# Patient Record
Sex: FEMALE | Race: WHITE | Employment: OTHER | URBAN - NONMETROPOLITAN AREA
[De-identification: names, ages, dates, MRNs, and addresses within clinical notes are randomized per-mention and may not be internally consistent; named-entity substitution may affect disease eponyms.]

---

## 2019-05-21 ENCOUNTER — HOSPITAL ENCOUNTER (OUTPATIENT)
Facility: HOSPITAL | Age: 59
End: 2019-05-21
Attending: ORTHOPAEDIC SURGERY | Admitting: ORTHOPAEDIC SURGERY
Payer: COMMERCIAL

## 2020-10-22 ENCOUNTER — TRANSFERRED RECORDS (OUTPATIENT)
Dept: HEALTH INFORMATION MANAGEMENT | Facility: HOSPITAL | Age: 60
End: 2020-10-22

## 2020-11-03 ENCOUNTER — TRANSFERRED RECORDS (OUTPATIENT)
Dept: HEALTH INFORMATION MANAGEMENT | Facility: HOSPITAL | Age: 60
End: 2020-11-03

## 2020-11-04 ENCOUNTER — HOSPITAL ENCOUNTER (INPATIENT)
Facility: HOSPITAL | Age: 60
Setting detail: SURGERY ADMIT
End: 2020-11-04
Attending: ORTHOPAEDIC SURGERY | Admitting: ORTHOPAEDIC SURGERY
Payer: COMMERCIAL

## 2020-11-09 ENCOUNTER — ANESTHESIA EVENT (OUTPATIENT)
Dept: SURGERY | Facility: HOSPITAL | Age: 60
End: 2020-11-09
Payer: COMMERCIAL

## 2020-11-09 ASSESSMENT — LIFESTYLE VARIABLES: TOBACCO_USE: 1

## 2020-11-09 NOTE — ANESTHESIA PREPROCEDURE EVALUATION
Anesthesia Pre-Procedure Evaluation    Patient: Ana Pittman   MRN: [id number] : 1960          Preoperative Diagnosis: Right knee pain, unspecified chronicity [M25.561]    Procedure(s):  RIGHT TOTAL KNEE ARTHROPLASTY    Past Medical History:   Diagnosis Date     Mitral valve regurgitation      Past Surgical History:   Procedure Laterality Date     CHOLECYSTECTOMY       GYN SURGERY       ORTHOPEDIC SURGERY       RELEASE CARPAL TUNNEL Left 2015    Procedure: RELEASE CARPAL TUNNEL;  Surgeon: Cali Calvert MD;  Location: [facility]     RELEASE CARPAL TUNNEL Right 2015    Procedure: RELEASE CARPAL TUNNEL;  Surgeon: Cali Calvert MD;  Location: [facility]       Anesthesia Evaluation     . Pt has had prior anesthetic. Type: General           ROS/MED HX    ENT/Pulmonary:     (+)PIO risk factors obese, tobacco use, Current use 1/2 packs/day  , . .    Neurologic:  - neg neurologic ROS     Cardiovascular:     (+) hypertension--CAD (rx NTG), --. : . . . :. valvular problems/murmurs type: AS murmur:. Previous cardiac testing Echodate:2020results:EF 65%  Mild to moderate aortic stenosis  2-3/6 murmur  Pt walks 3 miles per day.date: results:ECG reviewed date:2020 results:SB 51 date: results:          METS/Exercise Tolerance:  >4 METS   Hematologic:     (+) History of Transfusion -      Musculoskeletal:   (+) arthritis,  other musculoskeletal- DJD right knee      GI/Hepatic:  - neg GI/hepatic ROS       Renal/Genitourinary:  - ROS Renal section negative       Endo:     (+) Obesity, .      Psychiatric:     (+) psychiatric history anxiety and depression      Infectious Disease:  - neg infectious disease ROS       Malignancy:      - no malignancy   Other: Comment: rx Suboxone   (+) H/O chronic opiod use ,                         Physical Exam  Normal systems: pulmonary and dental    Airway   Mallampati: II  TM distance: >3 FB  Neck ROM: full    Dental     Cardiovascular   Rhythm and rate: regular and normal  (+)  "murmur       Pulmonary    breath sounds clear to auscultation            No results found for: WBC, HGB, HCT, PLT, CRP, SED, NA, POTASSIUM, CHLORIDE, CO2, BUN, CR, GLC, DOROTEO, PHOS, MAG, ALBUMIN, PROTTOTAL, ALT, AST, GGT, ALKPHOS, BILITOTAL, BILIDIRECT, LIPASE, AMYLASE, ADRIÁN, PTT, INR, FIBR, TSH, T4, T3, HCG, HCGS, CKTOTAL, CKMB, TROPN    Preop Vitals  BP Readings from Last 3 Encounters:   07/01/15 163/74   05/19/15 143/77    Pulse Readings from Last 3 Encounters:   No data found for Pulse      Resp Readings from Last 3 Encounters:   07/01/15 18   05/19/15 16    SpO2 Readings from Last 3 Encounters:   07/01/15 97%   05/19/15 96%      Temp Readings from Last 1 Encounters:   07/01/15 96.9  F (36.1  C) (Oral)    Ht Readings from Last 1 Encounters:   06/25/15 1.702 m (5' 7\")      Wt Readings from Last 1 Encounters:   06/25/15 100.7 kg (222 lb)    Estimated body mass index is 34.77 kg/m  as calculated from the following:    Height as of 6/25/15: 1.702 m (5' 7\").    Weight as of 6/25/15: 100.7 kg (222 lb).       Anesthesia Plan      History & Physical Review  History and physical reviewed and following examination; no interval change.    ASA Status:  3 .    NPO Status:  > 6 hours    Plan for Spinal, Peripheral Nerve Block and MAC (adductor canal) Maintenance will be TIVA.  Reason for MAC:  Chronic cardiopulmonary disease (G9) and Extreme anxiety (QS)    Needs cardiology clearance for aortic stenosis, due 11/10 with Dr. Barker gave clearance for surgery today.  OK to proceed.                Postoperative Care      Consents  Anesthetic plan, risks, benefits and alternatives discussed with:  Patient..                 TAMMI Dempsey CRNA  "

## 2020-11-10 ENCOUNTER — TRANSFERRED RECORDS (OUTPATIENT)
Dept: HEALTH INFORMATION MANAGEMENT | Facility: HOSPITAL | Age: 60
End: 2020-11-10

## 2020-11-10 LAB
ALT SERPL-CCNC: 23 U/L (ref 0–55)
AST SERPL-CCNC: 19 U/L (ref 0–35)
CREAT SERPL-MCNC: 0.82 MG/DL (ref 0.6–1.1)
GFR SERPL CREATININE-BSD FRML MDRD: 71 ML/MIN/1.73M2
GLUCOSE SERPL-MCNC: 91 MG/DL (ref 70–100)
POTASSIUM SERPL-SCNC: 4 MEQ/L (ref 3.5–5.3)

## 2020-11-10 RX ORDER — BUPRENORPHINE HYDROCHLORIDE AND NALOXONE HYDROCHLORIDE DIHYDRATE 8; 2 MG/1; MG/1
1 TABLET SUBLINGUAL 2 TIMES DAILY
COMMUNITY

## 2020-11-10 RX ORDER — POTASSIUM CHLORIDE 1500 MG/1
20 TABLET, EXTENDED RELEASE ORAL DAILY
COMMUNITY
Start: 2019-05-02

## 2020-11-10 RX ORDER — DIAZEPAM 10 MG
10 TABLET ORAL 2 TIMES DAILY
COMMUNITY
Start: 2019-10-01

## 2020-11-10 RX ORDER — IBUPROFEN 600 MG/1
600 TABLET, FILM COATED ORAL 2 TIMES DAILY PRN
Status: ON HOLD | COMMUNITY
Start: 2020-09-01 | End: 2020-11-11

## 2020-11-10 RX ORDER — PRAVASTATIN SODIUM 20 MG
20 TABLET ORAL DAILY
Status: ON HOLD | COMMUNITY
Start: 2019-02-22 | End: 2020-11-11

## 2020-11-10 RX ORDER — ESTRADIOL 0.04 MG/D
1 PATCH TRANSDERMAL
COMMUNITY

## 2020-11-10 RX ORDER — POTASSIUM CHLORIDE 1500 MG/1
20 TABLET, EXTENDED RELEASE ORAL DAILY
COMMUNITY
End: 2020-11-10

## 2020-11-10 NOTE — PLAN OF CARE
Prior to Admission Medication Reconciliation:     Medications added:   [] None  [x] As listed below:    veto aleman    Diazepam    suboxone    Medications deleted:   [] None  [x] As listed below:    OTC medications and older meds from 2015- pt hasn't been seen at a [place] facility since 2015 so any OTC medications need to be re-added. None of the ones deleted were on either medlist from [place] and [facility]:  Reviewed by Susan Stevenson ([facility]) on 11/10/20 at 1121    Medication Sig Status     Discontinued 11/10/20 1117     Discontinued 11/10/20 1116     Discontinued 11/10/20 1111   buprenorphine-naloxone (SUBOXONE) 8-2 MG SUBL sublingual tablet Place 1 tablet under the tongue 2 times daily  Active     Discontinued 11/10/20 1116     Discontinued 11/10/20 1119     Discontinued 11/10/20 1115     Discontinued 11/10/20 1119   diazepam (VALIUM) 10 MG tablet Take 10 mg by mouth 2 times daily  Active   DULoxetine (CYMBALTA) 60 MG capsule Take 60 mg by mouth 2 times daily  Active     Discontinued 11/10/20 1119   EPINEPHrine (EPIPEN) 0.3 MG/0.3ML injection Inject 0.3 mg into the muscle every five minutes as needed, in case of bee sting. Active   estradiol (CLIMARA) 0.0375 MG/24HR weekly patch Place 1 patch onto the skin 2 times daily Active     Discontinued 11/10/20 1116     Discontinued 11/10/20 1120     Discontinued 11/10/20 1116     Discontinued 11/10/20 1120     Discontinued 11/10/20 1116   ibuprofen (ADVIL/MOTRIN) 600 MG tablet Take 600 mg by mouth 2 times daily as needed Active     Discontinued 11/10/20 1120   lisinopril (ZESTRIL) 40 MG tablet Take 40 mg by mouth daily  Active     Discontinued 11/10/20 1120   nitroglycerin (NITROSTAT) 0.4 MG SL tablet Place under the tongue every 5 minutes as needed for chest pain for up to 3 doses. Active     Discontinued 11/10/20 1116   potassium chloride ER (KLOR-CON M) 20 MEQ CR tablet Take 20 mEq by mouth daily Active   pravastatin (PRAVACHOL) 20 MG tablet Take 20 mg by mouth  daily Active     Discontinued 11/10/20 1121   progesterone (PROMETRIUM) 100 MG capsule Take 100 mg by mouth daily  Active       Changes made to existing medications:   [] None  [x] As listed below:    Input missing strengths and frequencies    Last times/dates taken verified with patient:  [x] Pt not admitted yet  [] Nurse completed no changes made  [] Unable to review with patient:  [] Nurse completed/changes made:     Allergy modifications:    [x]Did not review  []Patient verified NKA  []Patient verified current existing allergies: no changes made  []New allergies: listed below    Medication reconciliation sources:   []Patient  []Patient family member/emergency contact: **  [][facility] Report Review  []Epic Chart Review  [x]Care Everywhere review: [facility]  Medication Sig Dispensed Refills Start Date End Date Status   potassium chloride CR (K-DUR, KLOR-CON M) 20 MEQ tablet   Take 1 Tab by mouth one time a day. Do not crush. 30 Tab   0 05/02/2019   Active   pravastatin (PRAVACHOL) 20 MG tablet   Take 1 Tab by mouth one time a day. 30 Tab   5 02/22/2019   Active   diazePAM (VALIUM) 10 MG tablet   Take 1 Tab by mouth two times a day. 60 Tab   0 10/01/2019   Active   EPINEPHrine, auto-injector, 0.3 MG/0.3ML Solution Auto-injector injection   Inject 1 pen (0.3 mL) every five minutes as needed in case of bee sting. 2 Pen   2 05/29/2019   Active   potassium chloride CR (K-DUR, KLOR-CON M) 20 MEQ tablet   Take 1 Tab by mouth one time a day. 90 Tab   2 06/11/2019   Active   progesterone (Prometrium) 100 MG capsule   Take 1 Cap by mouth one time a day. 30 Cap   1 01/27/2020   Active   nitroglycerin (Nitrostat) 0.4 MG sublingual tablet    Indications: Nonrheumatic aortic (valve) stenosis, Precordial pain Dissolve 1 tablet (0.4 mg) under the tongue Every 5 minutes as needed for chest pain for up to 3 doses May repeat every 5 minutes for a total of 3 doses. 25 Tab   0 03/12/2020   Active   ibuprofen (Motrin) 600 MG tablet    Take 1 Tab by mouth two times a day as needed. DC Robaxin 60 Tab   3 09/01/2020   Active   EPINEPHrine, auto-injector, 0.3 MG/0.3ML Solution Auto-injector injection   Inject 1 pen (0.3 mL) under the skin every five minutes as needed, in case of bee sting. 2 Each   2 09/01/2020   Active   [place]:  Medication Sig Dispensed Refills Start Date End Date Status   lisinopril (PRINIVIL, ZESTRIL) 40 mg tablet   Take 40 mg by mouth 1 time per day.   0     Active   DULoxetine (CYMBALTA) 60 mg capsule   Take 60 mg by mouth 2 times a day    0     Active   progesterone micronized (PROMETRIUM) 100 MG capsule   100 mg every night at bedtime.   0     Active   estradiol (CLIMARA) 0.0375 mg/24 hr patch   Apply 1 patch to the skin 2 times a week    0     Active   EPINEPHrine for anaphylaxis 0.3 mg/0.3 mL IM injection       0 05/14/2015   Active   buprenorphine-nalOXone (SUBOXONE) 8-2 MG SUBL   Dissolve 1 tablet under the tongue 2 times a day    0     Active   potassium chloride (KLOR-CON M20) 20 MEQ CR tablet   Take 20 mEq by mouth 1 time per day   0 05/02/2019   Active   nitroglycerin (NITROSTAT) 0.4 mg sublingual tablet    Indications: Nonrheumatic aortic valve stenosis, Precordial pain Dissolve 1 tablet (0.4 mg) under the tongue Every 5 minutes as needed for chest pain for up to 3 doses May repeat every 5 minutes for a total of 3 doses. 25 tablet   0 03/12/2020 03/17/2021 Active         [x]Pharmacy med list: [facility]/ [facility]   Name Strength Instructions Last Fill Date QTY/DS Notes   [x] progesterone 100 mg daily 9/8/20 90    [x] pravachol 20 mg daily 7/5/20  90 Being filled now   [x] Potassium chl 20 meq  daily 10/28/20 90    [x] lisinopril 40 mg daily 10/16/20 90    [x] duloxetine 60 mg BID 9/22/20 180    [x] vivelle dot 0.375 patch Twice weekly 9/9/20 90 ds    []         []         [x] diazepam 10 mg BID 11/5/20 30/15 [facility] [phone]   [x] suboxone  8-2 mg Dissolve one tablet under the tongue BID 11/3/20 60/30 [facility]    []Pharmacy phone call  []Outside meds dispense report  []Nursing home or Assisted Living MAR:  []Other: **    Pharmacy desired at discharge: uses [facility] or [facility]    Is patient on coumadin?  [x]No      Requests for consultation by provider or pharmacist:   [] Patient understands why all of their meds were prescribed and how to take them. No questions.   [x] Fill dates coincide with compliancy for all maintenance meds.   [] Fill dates do not coincide with compliancy with maintenance meds. See notes in PTA medlist about how patient is taking.   [] Patient has questions about the following:    Comments: medications ready to review with patient. Reconciled using [place], [facility] and pharmacy records. No concerns at this time.       Susan Stevenson on 11/10/2020 at 11:00 AM       Discrepancies: [x] No []Not Applicable []Yes: listed below    Time spent on medication reconciliation:   []5-20 mins  [x]20-40 mins  []> 40 mins    Issues completing PTA medication reconciliation:  [] On hold for a long time  [] Waited for a call back  [] Fax didn't come through  [] Fax took a long time  [] Other:    Notifying appropriate party of changes/additions/discrepancies:  []No pertinent changes made, notification not necessary.   [] Notified attending provider via text page  [] Notified attending provider in person  [] Notified pharmacy  [] Notified nurse  [] Attending provider not available, left detailed notes  [x] Changes/additions made don't need provider notification because provider has not seen patient or input any orders  [] Changes/additions made don't need provider notification because changes made are to medications not ordered

## 2020-11-11 ENCOUNTER — HOSPITAL ENCOUNTER (INPATIENT)
Facility: HOSPITAL | Age: 60
LOS: 2 days | Discharge: HOME HEALTH | End: 2020-11-13
Attending: ORTHOPAEDIC SURGERY | Admitting: ORTHOPAEDIC SURGERY
Payer: COMMERCIAL

## 2020-11-11 ENCOUNTER — APPOINTMENT (OUTPATIENT)
Dept: PHYSICAL THERAPY | Facility: HOSPITAL | Age: 60
End: 2020-11-11
Attending: ORTHOPAEDIC SURGERY
Payer: COMMERCIAL

## 2020-11-11 ENCOUNTER — APPOINTMENT (OUTPATIENT)
Dept: GENERAL RADIOLOGY | Facility: HOSPITAL | Age: 60
End: 2020-11-11
Attending: PHYSICIAN ASSISTANT
Payer: COMMERCIAL

## 2020-11-11 ENCOUNTER — ANESTHESIA (OUTPATIENT)
Dept: SURGERY | Facility: HOSPITAL | Age: 60
End: 2020-11-11
Payer: COMMERCIAL

## 2020-11-11 DIAGNOSIS — Z96.651 S/P TOTAL KNEE ARTHROPLASTY, RIGHT: Primary | ICD-10-CM

## 2020-11-11 PROCEDURE — 97161 PT EVAL LOW COMPLEX 20 MIN: CPT | Mod: GP

## 2020-11-11 PROCEDURE — 3E0T3BZ INTRODUCTION OF ANESTHETIC AGENT INTO PERIPHERAL NERVES AND PLEXI, PERCUTANEOUS APPROACH: ICD-10-PCS | Performed by: NURSE ANESTHETIST, CERTIFIED REGISTERED

## 2020-11-11 PROCEDURE — 370N000002 HC ANESTHESIA TECHNICAL FEE, EACH ADDTL 15 MIN: Performed by: ORTHOPAEDIC SURGERY

## 2020-11-11 PROCEDURE — 258N000003 HC RX IP 258 OP 636: Performed by: NURSE ANESTHETIST, CERTIFIED REGISTERED

## 2020-11-11 PROCEDURE — 999N000157 HC STATISTIC RCP TIME EA 10 MIN

## 2020-11-11 PROCEDURE — 76942 ECHO GUIDE FOR BIOPSY: CPT | Mod: 26 | Performed by: NURSE ANESTHETIST, CERTIFIED REGISTERED

## 2020-11-11 PROCEDURE — 370N000001 HC ANESTHESIA TECHNICAL FEE, 1ST 30 MIN: Performed by: ORTHOPAEDIC SURGERY

## 2020-11-11 PROCEDURE — 360N000033 HC SURGERY LEVEL 5 EA 15 ADDTL MIN: Performed by: ORTHOPAEDIC SURGERY

## 2020-11-11 PROCEDURE — 250N000009 HC RX 250: Performed by: NURSE ANESTHETIST, CERTIFIED REGISTERED

## 2020-11-11 PROCEDURE — 250N000011 HC RX IP 250 OP 636: Performed by: ORTHOPAEDIC SURGERY

## 2020-11-11 PROCEDURE — 0SRC0JA REPLACEMENT OF RIGHT KNEE JOINT WITH SYNTHETIC SUBSTITUTE, UNCEMENTED, OPEN APPROACH: ICD-10-PCS | Performed by: ORTHOPAEDIC SURGERY

## 2020-11-11 PROCEDURE — 761N000003 HC RECOVERY PHASE 1 LEVEL 2 FIRST HR: Performed by: ORTHOPAEDIC SURGERY

## 2020-11-11 PROCEDURE — C9290 INJ, BUPIVACAINE LIPOSOME: HCPCS | Performed by: ORTHOPAEDIC SURGERY

## 2020-11-11 PROCEDURE — 250N000011 HC RX IP 250 OP 636: Performed by: NURSE ANESTHETIST, CERTIFIED REGISTERED

## 2020-11-11 PROCEDURE — 88300 SURGICAL PATH GROSS: CPT | Mod: TC | Performed by: ORTHOPAEDIC SURGERY

## 2020-11-11 PROCEDURE — 250N000013 HC RX MED GY IP 250 OP 250 PS 637: Performed by: ORTHOPAEDIC SURGERY

## 2020-11-11 PROCEDURE — C1776 JOINT DEVICE (IMPLANTABLE): HCPCS | Performed by: ORTHOPAEDIC SURGERY

## 2020-11-11 PROCEDURE — 27447 TOTAL KNEE ARTHROPLASTY: CPT | Performed by: NURSE ANESTHETIST, CERTIFIED REGISTERED

## 2020-11-11 PROCEDURE — 360N000032 HC SURGERY LEVEL 5 1ST 30 MIN: Performed by: ORTHOPAEDIC SURGERY

## 2020-11-11 PROCEDURE — 999N000063 XR KNEE PORT RT 1/2 VW: Mod: RT

## 2020-11-11 PROCEDURE — 258N000001 HC RX 258: Performed by: ORTHOPAEDIC SURGERY

## 2020-11-11 PROCEDURE — 271N000001 HC OR GENERAL SUPPLY NON-STERILE: Performed by: ORTHOPAEDIC SURGERY

## 2020-11-11 PROCEDURE — 97530 THERAPEUTIC ACTIVITIES: CPT | Mod: GP

## 2020-11-11 PROCEDURE — 272N000001 HC OR GENERAL SUPPLY STERILE: Performed by: ORTHOPAEDIC SURGERY

## 2020-11-11 PROCEDURE — C9290 INJ, BUPIVACAINE LIPOSOME: HCPCS | Performed by: NURSE ANESTHETIST, CERTIFIED REGISTERED

## 2020-11-11 PROCEDURE — 64447 NJX AA&/STRD FEMORAL NRV IMG: CPT | Mod: XU | Performed by: NURSE ANESTHETIST, CERTIFIED REGISTERED

## 2020-11-11 PROCEDURE — 999N000135 HC STATISTIC PRE PROC ASSESS I: Performed by: ORTHOPAEDIC SURGERY

## 2020-11-11 PROCEDURE — 99232 SBSQ HOSP IP/OBS MODERATE 35: CPT | Performed by: INTERNAL MEDICINE

## 2020-11-11 PROCEDURE — 120N000001 HC R&B MED SURG/OB

## 2020-11-11 PROCEDURE — 250N000013 HC RX MED GY IP 250 OP 250 PS 637: Performed by: INTERNAL MEDICINE

## 2020-11-11 DEVICE — IMPLANTABLE DEVICE: Type: IMPLANTABLE DEVICE | Site: KNEE | Status: FUNCTIONAL

## 2020-11-11 RX ORDER — SODIUM CHLORIDE, SODIUM LACTATE, POTASSIUM CHLORIDE, CALCIUM CHLORIDE 600; 310; 30; 20 MG/100ML; MG/100ML; MG/100ML; MG/100ML
INJECTION, SOLUTION INTRAVENOUS CONTINUOUS
Status: DISCONTINUED | OUTPATIENT
Start: 2020-11-11 | End: 2020-11-11 | Stop reason: HOSPADM

## 2020-11-11 RX ORDER — DULOXETIN HYDROCHLORIDE 60 MG/1
60 CAPSULE, DELAYED RELEASE ORAL 2 TIMES DAILY
Status: DISCONTINUED | OUTPATIENT
Start: 2020-11-11 | End: 2020-11-13 | Stop reason: HOSPADM

## 2020-11-11 RX ORDER — HYDRALAZINE HYDROCHLORIDE 20 MG/ML
2.5-5 INJECTION INTRAMUSCULAR; INTRAVENOUS EVERY 10 MIN PRN
Status: DISCONTINUED | OUTPATIENT
Start: 2020-11-11 | End: 2020-11-11 | Stop reason: HOSPADM

## 2020-11-11 RX ORDER — AMOXICILLIN 250 MG
1 CAPSULE ORAL 2 TIMES DAILY
Status: DISCONTINUED | OUTPATIENT
Start: 2020-11-11 | End: 2020-11-13 | Stop reason: HOSPADM

## 2020-11-11 RX ORDER — DIAZEPAM 5 MG
5 TABLET ORAL 2 TIMES DAILY
Status: DISCONTINUED | OUTPATIENT
Start: 2020-11-11 | End: 2020-11-13 | Stop reason: HOSPADM

## 2020-11-11 RX ORDER — NALOXONE HYDROCHLORIDE 0.4 MG/ML
.1-.4 INJECTION, SOLUTION INTRAMUSCULAR; INTRAVENOUS; SUBCUTANEOUS
Status: DISCONTINUED | OUTPATIENT
Start: 2020-11-11 | End: 2020-11-11 | Stop reason: HOSPADM

## 2020-11-11 RX ORDER — BISACODYL 10 MG
10 SUPPOSITORY, RECTAL RECTAL DAILY PRN
Status: DISCONTINUED | OUTPATIENT
Start: 2020-11-11 | End: 2020-11-13 | Stop reason: HOSPADM

## 2020-11-11 RX ORDER — FENTANYL CITRATE 50 UG/ML
25-50 INJECTION, SOLUTION INTRAMUSCULAR; INTRAVENOUS
Status: DISCONTINUED | OUTPATIENT
Start: 2020-11-11 | End: 2020-11-11 | Stop reason: HOSPADM

## 2020-11-11 RX ORDER — OXYCODONE HYDROCHLORIDE 5 MG/1
15 TABLET ORAL EVERY 4 HOURS PRN
Status: DISCONTINUED | OUTPATIENT
Start: 2020-11-11 | End: 2020-11-13 | Stop reason: HOSPADM

## 2020-11-11 RX ORDER — LISINOPRIL 20 MG/1
40 TABLET ORAL DAILY
Status: DISCONTINUED | OUTPATIENT
Start: 2020-11-12 | End: 2020-11-13 | Stop reason: HOSPADM

## 2020-11-11 RX ORDER — LABETALOL 20 MG/4 ML (5 MG/ML) INTRAVENOUS SYRINGE
10
Status: DISCONTINUED | OUTPATIENT
Start: 2020-11-11 | End: 2020-11-11 | Stop reason: HOSPADM

## 2020-11-11 RX ORDER — BUPIVACAINE HYDROCHLORIDE 2.5 MG/ML
INJECTION, SOLUTION EPIDURAL; INFILTRATION; INTRACAUDAL PRN
Status: DISCONTINUED | OUTPATIENT
Start: 2020-11-11 | End: 2020-11-11

## 2020-11-11 RX ORDER — DOCUSATE SODIUM 100 MG/1
100 CAPSULE, LIQUID FILLED ORAL 2 TIMES DAILY
Status: DISCONTINUED | OUTPATIENT
Start: 2020-11-11 | End: 2020-11-13 | Stop reason: HOSPADM

## 2020-11-11 RX ORDER — METHOCARBAMOL 750 MG/1
750 TABLET, FILM COATED ORAL EVERY 6 HOURS PRN
Status: DISCONTINUED | OUTPATIENT
Start: 2020-11-11 | End: 2020-11-13 | Stop reason: HOSPADM

## 2020-11-11 RX ORDER — DIPHENHYDRAMINE HCL 25 MG
25 CAPSULE ORAL EVERY 6 HOURS PRN
Status: DISCONTINUED | OUTPATIENT
Start: 2020-11-11 | End: 2020-11-13 | Stop reason: HOSPADM

## 2020-11-11 RX ORDER — LIDOCAINE 40 MG/G
CREAM TOPICAL
Status: DISCONTINUED | OUTPATIENT
Start: 2020-11-11 | End: 2020-11-11 | Stop reason: HOSPADM

## 2020-11-11 RX ORDER — ONDANSETRON 4 MG/1
4 TABLET, ORALLY DISINTEGRATING ORAL EVERY 30 MIN PRN
Status: DISCONTINUED | OUTPATIENT
Start: 2020-11-11 | End: 2020-11-11 | Stop reason: HOSPADM

## 2020-11-11 RX ORDER — OXYCODONE HYDROCHLORIDE 5 MG/1
10 TABLET ORAL
Status: DISCONTINUED | OUTPATIENT
Start: 2020-11-11 | End: 2020-11-13 | Stop reason: HOSPADM

## 2020-11-11 RX ORDER — DIAZEPAM 5 MG
10 TABLET ORAL 2 TIMES DAILY
Status: DISCONTINUED | OUTPATIENT
Start: 2020-11-11 | End: 2020-11-11

## 2020-11-11 RX ORDER — HYDROMORPHONE HYDROCHLORIDE 1 MG/ML
0.5 INJECTION, SOLUTION INTRAMUSCULAR; INTRAVENOUS; SUBCUTANEOUS
Status: DISCONTINUED | OUTPATIENT
Start: 2020-11-11 | End: 2020-11-13 | Stop reason: HOSPADM

## 2020-11-11 RX ORDER — NICOTINE 21 MG/24HR
1 PATCH, TRANSDERMAL 24 HOURS TRANSDERMAL DAILY
Status: DISCONTINUED | OUTPATIENT
Start: 2020-11-11 | End: 2020-11-13 | Stop reason: HOSPADM

## 2020-11-11 RX ORDER — BUPIVACAINE HYDROCHLORIDE 7.5 MG/ML
INJECTION, SOLUTION INTRASPINAL PRN
Status: DISCONTINUED | OUTPATIENT
Start: 2020-11-11 | End: 2020-11-11

## 2020-11-11 RX ORDER — FENTANYL CITRATE 50 UG/ML
25-50 INJECTION, SOLUTION INTRAMUSCULAR; INTRAVENOUS EVERY 5 MIN PRN
Status: DISCONTINUED | OUTPATIENT
Start: 2020-11-11 | End: 2020-11-11 | Stop reason: HOSPADM

## 2020-11-11 RX ORDER — BUPIVACAINE HYDROCHLORIDE 2.5 MG/ML
INJECTION, SOLUTION EPIDURAL; INFILTRATION; INTRACAUDAL
Status: DISCONTINUED
Start: 2020-11-11 | End: 2020-11-11 | Stop reason: HOSPADM

## 2020-11-11 RX ORDER — POLYETHYLENE GLYCOL 3350 17 G/17G
17 POWDER, FOR SOLUTION ORAL DAILY
Status: DISCONTINUED | OUTPATIENT
Start: 2020-11-12 | End: 2020-11-13 | Stop reason: HOSPADM

## 2020-11-11 RX ORDER — CEFAZOLIN SODIUM 2 G/100ML
2 INJECTION, SOLUTION INTRAVENOUS
Status: COMPLETED | OUTPATIENT
Start: 2020-11-11 | End: 2020-11-11

## 2020-11-11 RX ORDER — ONDANSETRON 2 MG/ML
4 INJECTION INTRAMUSCULAR; INTRAVENOUS EVERY 30 MIN PRN
Status: DISCONTINUED | OUTPATIENT
Start: 2020-11-11 | End: 2020-11-11 | Stop reason: HOSPADM

## 2020-11-11 RX ORDER — LIDOCAINE 40 MG/G
CREAM TOPICAL
Status: DISCONTINUED | OUTPATIENT
Start: 2020-11-11 | End: 2020-11-13 | Stop reason: HOSPADM

## 2020-11-11 RX ORDER — MEPERIDINE HYDROCHLORIDE 25 MG/ML
12.5 INJECTION INTRAMUSCULAR; INTRAVENOUS; SUBCUTANEOUS
Status: DISCONTINUED | OUTPATIENT
Start: 2020-11-11 | End: 2020-11-11 | Stop reason: HOSPADM

## 2020-11-11 RX ORDER — PRAVASTATIN SODIUM 20 MG
20 TABLET ORAL DAILY
Status: DISCONTINUED | OUTPATIENT
Start: 2020-11-11 | End: 2020-11-13 | Stop reason: HOSPADM

## 2020-11-11 RX ORDER — PROPOFOL 10 MG/ML
INJECTION, EMULSION INTRAVENOUS CONTINUOUS PRN
Status: DISCONTINUED | OUTPATIENT
Start: 2020-11-11 | End: 2020-11-11

## 2020-11-11 RX ORDER — PROCHLORPERAZINE MALEATE 5 MG
10 TABLET ORAL EVERY 6 HOURS PRN
Status: DISCONTINUED | OUTPATIENT
Start: 2020-11-11 | End: 2020-11-13 | Stop reason: HOSPADM

## 2020-11-11 RX ORDER — BUPIVACAINE HYDROCHLORIDE 2.5 MG/ML
INJECTION, SOLUTION INFILTRATION; PERINEURAL PRN
Status: DISCONTINUED | OUTPATIENT
Start: 2020-11-11 | End: 2020-11-11 | Stop reason: HOSPADM

## 2020-11-11 RX ORDER — ONDANSETRON 4 MG/1
4 TABLET, ORALLY DISINTEGRATING ORAL EVERY 6 HOURS PRN
Status: DISCONTINUED | OUTPATIENT
Start: 2020-11-11 | End: 2020-11-13 | Stop reason: HOSPADM

## 2020-11-11 RX ORDER — CEFAZOLIN SODIUM 2 G/100ML
2 INJECTION, SOLUTION INTRAVENOUS EVERY 8 HOURS
Status: COMPLETED | OUTPATIENT
Start: 2020-11-11 | End: 2020-11-12

## 2020-11-11 RX ORDER — IBUPROFEN 600 MG/1
600 TABLET, FILM COATED ORAL EVERY 6 HOURS PRN
Status: DISCONTINUED | OUTPATIENT
Start: 2020-11-11 | End: 2020-11-13 | Stop reason: HOSPADM

## 2020-11-11 RX ORDER — ONDANSETRON 2 MG/ML
4 INJECTION INTRAMUSCULAR; INTRAVENOUS EVERY 6 HOURS PRN
Status: DISCONTINUED | OUTPATIENT
Start: 2020-11-11 | End: 2020-11-13 | Stop reason: HOSPADM

## 2020-11-11 RX ORDER — HYDROMORPHONE HYDROCHLORIDE 1 MG/ML
.3-.5 INJECTION, SOLUTION INTRAMUSCULAR; INTRAVENOUS; SUBCUTANEOUS EVERY 10 MIN PRN
Status: DISCONTINUED | OUTPATIENT
Start: 2020-11-11 | End: 2020-11-11 | Stop reason: HOSPADM

## 2020-11-11 RX ADMIN — HYDROMORPHONE HYDROCHLORIDE 0.5 MG: 1 INJECTION, SOLUTION INTRAMUSCULAR; INTRAVENOUS; SUBCUTANEOUS at 21:26

## 2020-11-11 RX ADMIN — SODIUM CHLORIDE, POTASSIUM CHLORIDE, SODIUM LACTATE AND CALCIUM CHLORIDE: 600; 310; 30; 20 INJECTION, SOLUTION INTRAVENOUS at 09:08

## 2020-11-11 RX ADMIN — SODIUM CHLORIDE, POTASSIUM CHLORIDE, SODIUM LACTATE AND CALCIUM CHLORIDE: 600; 310; 30; 20 INJECTION, SOLUTION INTRAVENOUS at 07:29

## 2020-11-11 RX ADMIN — ASPIRIN 325 MG: 325 TABLET, COATED ORAL at 11:36

## 2020-11-11 RX ADMIN — DOCUSATE SODIUM 100 MG: 100 CAPSULE, LIQUID FILLED ORAL at 11:36

## 2020-11-11 RX ADMIN — HYDROMORPHONE HYDROCHLORIDE 0.5 MG: 1 INJECTION, SOLUTION INTRAMUSCULAR; INTRAVENOUS; SUBCUTANEOUS at 18:18

## 2020-11-11 RX ADMIN — HYDROMORPHONE HYDROCHLORIDE 0.5 MG: 1 INJECTION, SOLUTION INTRAMUSCULAR; INTRAVENOUS; SUBCUTANEOUS at 12:38

## 2020-11-11 RX ADMIN — PROGESTERONE 100 MG: 100 CAPSULE ORAL at 20:05

## 2020-11-11 RX ADMIN — TRANEXAMIC ACID 1 G: 1 INJECTION, SOLUTION INTRAVENOUS at 08:14

## 2020-11-11 RX ADMIN — PRAVASTATIN SODIUM 20 MG: 20 TABLET ORAL at 11:36

## 2020-11-11 RX ADMIN — TRANEXAMIC ACID 1 G: 1 INJECTION, SOLUTION INTRAVENOUS at 09:04

## 2020-11-11 RX ADMIN — BUPIVACAINE 10 ML: 13.3 INJECTION, SUSPENSION, LIPOSOMAL INFILTRATION at 08:13

## 2020-11-11 RX ADMIN — CEFAZOLIN SODIUM 2 G: 2 INJECTION, SOLUTION INTRAVENOUS at 08:11

## 2020-11-11 RX ADMIN — PROPOFOL 75 MCG/KG/MIN: 10 INJECTION, EMULSION INTRAVENOUS at 07:56

## 2020-11-11 RX ADMIN — CEFAZOLIN SODIUM 2 G: 2 INJECTION, SOLUTION INTRAVENOUS at 16:06

## 2020-11-11 RX ADMIN — DOCUSATE SODIUM 100 MG: 100 CAPSULE, LIQUID FILLED ORAL at 20:05

## 2020-11-11 RX ADMIN — SODIUM CHLORIDE, POTASSIUM CHLORIDE, SODIUM LACTATE AND CALCIUM CHLORIDE: 600; 310; 30; 20 INJECTION, SOLUTION INTRAVENOUS at 07:52

## 2020-11-11 RX ADMIN — SENNOSIDES AND DOCUSATE SODIUM 1 TABLET: 8.6; 5 TABLET ORAL at 20:05

## 2020-11-11 RX ADMIN — DEXTROSE AND SODIUM CHLORIDE: 5; 450 INJECTION, SOLUTION INTRAVENOUS at 23:40

## 2020-11-11 RX ADMIN — OXYCODONE HYDROCHLORIDE 10 MG: 5 TABLET ORAL at 13:55

## 2020-11-11 RX ADMIN — SENNOSIDES AND DOCUSATE SODIUM 1 TABLET: 8.6; 5 TABLET ORAL at 11:36

## 2020-11-11 RX ADMIN — BUPIVACAINE HYDROCHLORIDE IN DEXTROSE 1.6 ML: 7.5 INJECTION, SOLUTION SUBARACHNOID at 08:02

## 2020-11-11 RX ADMIN — IBUPROFEN 600 MG: 600 TABLET ORAL at 14:56

## 2020-11-11 RX ADMIN — DIAZEPAM 5 MG: 5 TABLET ORAL at 21:24

## 2020-11-11 RX ADMIN — OXYCODONE HYDROCHLORIDE 10 MG: 5 TABLET ORAL at 20:04

## 2020-11-11 RX ADMIN — HYDROMORPHONE HYDROCHLORIDE 0.5 MG: 1 INJECTION, SOLUTION INTRAMUSCULAR; INTRAVENOUS; SUBCUTANEOUS at 16:06

## 2020-11-11 RX ADMIN — NICOTINE 1 PATCH: 21 PATCH, EXTENDED RELEASE TRANSDERMAL at 16:11

## 2020-11-11 RX ADMIN — DEXTROSE AND SODIUM CHLORIDE: 5; 450 INJECTION, SOLUTION INTRAVENOUS at 11:34

## 2020-11-11 RX ADMIN — IBUPROFEN 600 MG: 600 TABLET ORAL at 21:26

## 2020-11-11 RX ADMIN — DULOXETINE HYDROCHLORIDE 60 MG: 60 CAPSULE, DELAYED RELEASE ORAL at 20:05

## 2020-11-11 RX ADMIN — BUPIVACAINE HYDROCHLORIDE 10 ML: 2.5 INJECTION, SOLUTION EPIDURAL; INFILTRATION; INTRACAUDAL at 08:13

## 2020-11-11 ASSESSMENT — ACTIVITIES OF DAILY LIVING (ADL)
ADLS_ACUITY_SCORE: 15
ADLS_ACUITY_SCORE: 14
ADLS_ACUITY_SCORE: 15

## 2020-11-11 ASSESSMENT — MIFFLIN-ST. JEOR: SCORE: 1539.4

## 2020-11-11 NOTE — ANESTHESIA PROCEDURE NOTES
Procedure note : intrathecal      Staff -   CRNA: Lucio Siddiqui APRN CRNA  Performed By: CRNA  Pre-Procedure    Location:   Procedure Times:11/11/2020 7:58 AM and 11/11/2020 8:03 AM  Pre-Anesthestic Checklist: patient identified, IV checked, site marked, risks and benefits discussed, informed consent, monitors and equipment checked, pre-op evaluation, at physician/surgeon's request and post-op pain management    Timeout  Correct Patient: Yes   Correct Procedure: Yes   Correct Site: Yes   Correct Laterality: Yes   Correct Position: Yes   Site Marked: Yes   .   Procedure Documentation    .    Procedure: intrathecal, .   Patient Position:sitting Insertion Site:L2-3  (midline approach)     Patient Prep/Sterile Barriers; chlorhexidine gluconate and isopropyl alcohol.  .  Needle:  Spinal Needle (gauge): 25  Spinal/LP Needle Length (inches): 3.5 # of attempts: 1 and # of redirects:  Introducer used .        Assessment/Narrative  Paresthesias: No.  .  .  clear CSF fluid removed .

## 2020-11-11 NOTE — BRIEF OP NOTE
[facility]    Brief Operative Note    Pre-operative diagnosis: Right knee pain, unspecified chronicity [M25.561]  Post-operative diagnosis Same as pre-operative diagnosis    Procedure: Procedure(s):  RIGHT TOTAL KNEE ARTHROPLASTY  Surgeon: Surgeon(s) and Role:     * David Bentley MD - Primary     * Abilio Miranda - Assisting  Anesthesia: General   Estimated blood loss: Minimal  Drains: None  Specimens:   ID Type Source Tests Collected by Time Destination   A : RIGHT KNEE BONE AND TISSUE Tissue Knee, Right SURGICAL PATHOLOGY EXAM David Bentley MD 11/11/2020  8:50 AM      Findings:   None.  Complications: None.  Implants:   Implant Name Type Inv. Item Serial No.  Lot No. LRB No. Used Action   FEMORAL COMPONENT-CR SZ #4 LEFT TRIATHLON  Femoral Component-CR Sz #4 Left Triathlon  ROBSON [id number] Right 1 Implanted   TRIATHLON TIBIAL COMPONENT     [id number] Right 1 Implanted   TRIATHLON  [id number] BEARING INSERT-CS     [id number] Right 1 Implanted   TRIATHLON TRITANIUM ASYMMETRIC     [id number] Right 1 Implanted

## 2020-11-11 NOTE — PROGRESS NOTES
[facility]    Hospitalist Progress Note    Date of Service (when I saw the patient): 11/11/2020    Assessment & Plan   Ana Pittman is a 60 year old female who was admitted on 11/11/2020 s/p R TKA.    POD #0 s/p R elective TKA: post op care and protocol per orthopedics.    CVS: History of hypertension, mild to moderate aortic stenosis.  Lisinopril his only vasoactive medication.  -Continue as able, already ordered    Anxiety/depression: Continue outpatient Valium, Cymbalta    Chronic pain syndrome: Patient is on Suboxone at baseline.  -Patient has oxycodone ordered for pain control here    DVT Prophylaxis: Defer to primary service    Code Status: Full Code    Disposition: Expected discharge in 1-2 days per ortho.    Rayray Lopez MD      Interval History   Patient seen in room.  Doing well.  Operative pain controlled.  Denies cp, sob, abdominal pain, nausea.    -Data reviewed today: I reviewed all new labs and imaging results over the last 24 hours. I personally reviewed imaging reports.    Physical Exam   Temp: 97.4  F (36.3  C) Temp src: Temporal BP: 177/76 Pulse: 55   Resp: (!) 9 SpO2: 95 % O2 Device: None (Room air) Oxygen Delivery: 2 LPM  Vitals:    11/11/20 0724   Weight: 93 kg (205 lb)     Vital Signs with Ranges  Temp:  [97  F (36.1  C)-98  F (36.7  C)] 97.4  F (36.3  C)  Pulse:  [48-60] 55  Resp:  [5-20] 9  BP: (124-178)/() 177/76  SpO2:  [91 %-100 %] 95 %    Intake/Output Summary (Last 24 hours) at 11/11/2020 1222  Last data filed at 11/11/2020 0908  Gross per 24 hour   Intake 1000 ml   Output --   Net 1000 ml       Peripheral IV 11/11/20 Right Lower forearm (Active)   Site Assessment WDL 11/11/20 1021   Line Status Infusing 11/11/20 1021   Dressing Intervention New dressing  11/11/20 0726   Phlebitis Scale 0-->no symptoms 11/11/20 0726   Infiltration Scale 0 11/11/20 0726   Infiltration Site Treatment Method  None 11/11/20 0726   If infiltrated, was a Vesicant infusing? No 11/11/20  0726   Number of days: 0       Incision/Surgical Site 05/19/15 Left Arm (Active)   Number of days: 2003       Incision/Surgical Site 07/01/15 Right Wrist (Active)   Number of days: 1960       Incision/Surgical Site 11/11/20 Right Knee (Active)   Incision Assessment UTV 11/11/20 1021   Closure Approximated;Sutures;Staples 11/11/20 0849   Incision Drainage Amount None 11/11/20 1021   Dressing Intervention Clean, dry, intact 11/11/20 1021   Number of days: 0       Constitutional - AA, NAD  HEENT - atraumatic, normocephalic  Neck - supple, no masses, no JVD  CVS - S1 S2 RRR, 3/6 systolic murmur, no rubs or gallops  Respiratory - CTA b/l  GI - soft, NT, ND, + bowel sounds, no organomegaly  Musculoskeletal - no LE edema, no lesions  Neuro - oriented x 3, no gross focal deficits    Medications     dextrose 5% and 0.45% NaCl 75 mL/hr at 11/11/20 1134       aspirin  325 mg Oral Daily     ceFAZolin  2 g Intravenous Q8H     diazepam  10 mg Oral BID     docusate sodium  100 mg Oral BID     DULoxetine  60 mg Oral BID     [START ON 11/12/2020] lisinopril  40 mg Oral Daily     [START ON 11/12/2020] polyethylene glycol  17 g Oral Daily     pravastatin  20 mg Oral Daily     progesterone  100 mg Oral At Bedtime     senna-docusate  1 tablet Oral BID     sodium chloride (PF)  3 mL Intracatheter Q8H       Data   No lab results found in last 7 days.       Recent Results (from the past 24 hour(s))   XR Knee Port Right 1/2 Views    Narrative    PROCEDURE: XR KNEE PORT RT 1/2 VW 11/11/2020 11:03 AM    HISTORY: post right total knee    COMPARISONS: 8/10/2020.    TECHNIQUE: 2 views.    FINDINGS: There is a right knee arthroplasty with components in  satisfactory position. Skin staples are seen anteriorly.    No acute bony abnormality is seen.         Impression    IMPRESSION: Right knee arthroplasty.    MD Rayray FROST MD

## 2020-11-11 NOTE — PLAN OF CARE
[facility] Inpatient Admission Note:    Patient admitted to 3116 /3116-1 at approximately 1050 via ambulation accompanied by nurse from surgery . Report received from Susana in SBAR format at 1050 via face to face in room. Patient was brought up in bed t Patient is alert and oriented X 3, denies pain; rates at 0 on 0-10 scale.  Patient oriented to room, unit, hourly rounding, and plan of care. Explained admission packet and patient handbook with patient bill of rights brochure. Will continue to monitor and document as needed.     Inpatient Nursing criteria listed below was met:    Health care directives status obtained and documented: Yes    Care Everywhere authorization obtained Yes    MRSA swab completed for patient 65 years and older: N/A    Patient identifies a surrogate decision maker: Yes If yes, who:Father Bud Contact Information:refer to facesheet     If initial lactic acid >2.0, repeat lactic acid drawn within one hour of arrival to unit: NA. If no, state reason:     Vaccination assessment and education completed: Yes   Vaccinations received prior to admission: Pneumovax no  Influenza(seasonal)  NO   Vaccination(s) ordered: patient declines    Clergy visit ordered if patient requests: No    Skin issues/needs documented: Yes    Isolation Patient: no Education given, correct sign in place and documentation row added to PCS:  No    Fall Prevention Yes: Care plan updated, education given and documented, sticker and magnet in place: Yes    Care Plan initiated: Yes    Education Documented (including assessment): Yes    Patient has discharge needs : Not at this time

## 2020-11-11 NOTE — OR NURSING
Pt  Awake and talking, denies pain, sensation returning to legs after spinal anesthesia, Pt's HR does run bradycardic in the 40's this is patients norm.  Saw cardiologist yesterday, no concerns, also discussed with anethesia today, pt has no symptoms with low heart rate, stable to transfer to nursing floor.

## 2020-11-11 NOTE — PROGRESS NOTES
Inpatient Physical Therapy Evaluation    Name: Ana Pittman MRN# [id number]   Age: 60 year old YOB: 1960     Date of Consultation: 2020  Consultation is requested by:  Dr. Bentley  Specific Consultation Request:  eval and treat  Primary care provider: Stephanie Sepulveda    General Information:   Onset Date: 20    History of Current Problem/Admission: Right knee pain, unspecified chronicity [M25.561]    Prior Level of Function: Patient reports that she was dealing with knee pain for many years. Would like to get left side done in 4 weeks with Dr. Bentley. Does have FWW at home. Lives in a one level home with father who is in his 80s. Lives in [place]  Ambulation: 0 - Independent   Transferrin - Independent   Toiletin - Independent    Bathin - Independent   Dressin - Independent   Eatin - Independent   Communication: 0 - Understands/communicates without difficulty  Swallowin - swallows foods/liquids without difficulty  Cognition: 0 - no cognitive issues reported    Fall history within the last 6 months: No    Current Living Situation: Patient has 0 stairs to enter the home. Lives at home with father    Current Equipment Used at Home: none     Patient & Family Goals: return home     Past Medical History:   Past Medical History:   Diagnosis Date     Mitral valve regurgitation        Past Surgical History:  Past Surgical History:   Procedure Laterality Date     CHOLECYSTECTOMY       GYN SURGERY       ORTHOPEDIC SURGERY       RELEASE CARPAL TUNNEL Left 2015    Procedure: RELEASE CARPAL TUNNEL;  Surgeon: Cali Calvert MD;  Location: [facility]     RELEASE CARPAL TUNNEL Right 2015    Procedure: RELEASE CARPAL TUNNEL;  Surgeon: Cali Calvert MD;  Location: [facility]       Medications:   Current Facility-Administered Medications   Medication     aspirin (ASA) EC tablet 325 mg     benzocaine-menthol (CEPACOL) 15-3.6 MG lozenge 1 lozenge     bisacodyl  (DULCOLAX) Suppository 10 mg     ceFAZolin (ANCEF) intermittent infusion 2 g in 100 mL dextrose PRE-MIX     dextrose 5% and 0.45% NaCl infusion     diazepam (VALIUM) tablet 10 mg     diphenhydrAMINE (BENADRYL) capsule 25 mg     docusate sodium (COLACE) capsule 100 mg     DULoxetine (CYMBALTA) DR capsule 60 mg     HYDROmorphone (PF) (DILAUDID) injection 0.5 mg     ibuprofen (ADVIL/MOTRIN) tablet 600 mg     lidocaine (LMX4) kit     lidocaine 1 % 0.1-1 mL     [START ON 2020] lisinopril (ZESTRIL) tablet 40 mg     magnesium hydroxide (MILK OF MAGNESIA) suspension 30 mL     methocarbamol (ROBAXIN) tablet 750 mg     ondansetron (ZOFRAN-ODT) ODT tab 4 mg    Or     ondansetron (ZOFRAN) injection 4 mg     oxyCODONE (ROXICODONE) tablet 10 mg    Or     oxyCODONE (ROXICODONE) tablet 15 mg     [START ON 2020] polyethylene glycol (MIRALAX) Packet 17 g     pravastatin (PRAVACHOL) tablet 20 mg     prochlorperazine (COMPAZINE) injection 10 mg    Or     prochlorperazine (COMPAZINE) tablet 10 mg     progesterone (PROMETRIUM) capsule 100 mg     senna-docusate (SENOKOT-S/PERICOLACE) 8.6-50 MG per tablet 1 tablet     sodium chloride (PF) 0.9% PF flush 3 mL     sodium chloride (PF) 0.9% PF flush 3 mL     sodium phosphate (FLEET ENEMA) 1 enema       Weight Bearing Status: WBAT     Cognitive Status Examination:  Orientation: awake and alert  Level of Consciousness: alert  Follows Commands and Answers Questions: 100% of the time  Personal Safety and Judgement: Intact  Memory: Immediate recall intact  Comments:     Pain:   Currently in pain? Yes  Pain Location? right knee  Pain Ratin    Physical Therapy Evaluation:   Integumentary/Edema: moderate swelling noted in R knee  ROM: 10 to 70 degrees roughly in seated position  Strength: deferred MMT  Bed Mobility: CGA x 1  Transfers: CGA x 1 with FWW  Gait: 40 feet x 2 trials with FWW  Stairs: NT  Balance: NT  Sensory: NT  Coordination: NT    Physical Therapy Interventions: Gait  Training , Strengthening and Transfer Training    Clinical Impressions:  Criteria for Skilled Therapeutic Intervention Met: Yes, treatment indicated  PT Diagnosis: R TKA  Influenced by the following impairments: pain, weakness, decreased mobility  Functional limitations due to impairment: difficulty with stairs, ambulation, functional movement  Clinical presentation: Stable/Uncomplicated  Clinical presentation rationale: clinical judgement  Clinical Decision making (complexity): Low Complexity  Frequency: 6 times/week  Predicted Duration of Therapy Intervention (days/wks): 5 days  Anticipated Discharge Disposition: Home with Assist, Home with Home Therapy and Transitional Care Facility depending on progress  Anticipated Equipment Needs at Discharge: none  Risks and Benefits of therapy have been explained: Yes  Patient, Family & other staff in agreement with plan of care: Yes  Clinical Impression Comments: Recommend home with outpatient/home therapy or short term rehab depending on progress with PT tomorrow. If patient shows progress tomorrow - discharge home likely       Total Eval Time: 25 minutes

## 2020-11-11 NOTE — PROGRESS NOTES
11/11/20 1400   Signing Clinician's Name / Credentials   Signing clinician's name / credentials Baltazar Connor DPT   Quick Adds   Rehab Discipline PT   Quick Adds Therapeutic Activity   Therapeutic Activity   Minutes of Treatment 15 minutes   Symptoms Noted During/After Treatment Increased pain   Treatment Detail Patient sitting up in chair at start of therapy. Puts pain level at 5/10 currently. Agreeable to Tx. Sit to stand CGA x 1 with FWW. Ambulated roughly 40 feet x 2 trials with FWW. No LOB with ambulation activities. Education on heel to toe gait pattern.   PT Discharge Planning    PT Discharge Recommendation (DC Rec) home with home care physical therapy;home with outpatient physical therapy;Transitional Care Facility   PT Rationale for DC Rec Recommend home with outpatient/home therapy or short term rehab depending on progress with PT in upcoming days. If patient shows progress tomorrow - discharge home likely    PT Brief overview of current status  Currently needing assist of 1 for transfers and functional mobility   Additional Documentation   Rehab Comments Continue to advance mobility and functional mobility   PT Plan Continued gait and transfer training   Total Session Time   Total Session Time (minutes) 15 minutes

## 2020-11-11 NOTE — ANESTHESIA POSTPROCEDURE EVALUATION
Patient: Ana Pittman    Procedure(s):  RIGHT TOTAL KNEE ARTHROPLASTY    Diagnosis:Right knee pain, unspecified chronicity [M25.561]  Diagnosis Additional Information: No value filed.    Anesthesia Type:  Spinal, Peripheral Nerve Block, MAC    Note:  Anesthesia Post Evaluation    Patient location during evaluation: Phase 2  Patient participation: Able to fully participate in evaluation  Level of consciousness: awake and alert  Pain management: adequate  Airway patency: patent  Cardiovascular status: acceptable  Respiratory status: acceptable, room air and spontaneous ventilation  Hydration status: acceptable  PONV: none             Last vitals:  Vitals:    11/11/20 1020 11/11/20 1043 11/11/20 1101   BP: 169/83 166/79 156/72   Pulse: 52 54 58   Resp: (!) 9     Temp: 98  F (36.7  C) 97.4  F (36.3  C)    SpO2: (!) 91% 98% 98%         Electronically Signed By: TAMMI Dangelo CRNA  November 11, 2020  11:27 AM

## 2020-11-11 NOTE — OP NOTE
Procedure Date: 11/11/2020      PREOPERATIVE DIAGNOSIS:  Right knee osteoarthritis.      POSTOPERATIVE DIAGNOSIS:  Right knee osteoarthritis.      PROCEDURE:  Right total knee arthroplasty.      SURGEON:  David Bentley MD      ASSISTANT SURGEON:  Jose Ramon Miranda PA-C      ANESTHESIA:  Spinal with MAC.      INDICATIONS:  Ana Pittman is a 60-year-old female with end-stage osteoarthritis of her right knee.  All the risks and benefits of surgical intervention were discussed with her and she wished to proceed.      DESCRIPTION OF PROCEDURE:  The patient was taken to the operating room.  After adequate induction of anesthesia, she was positioned, prepped and draped in the usual sterile fashion on the operative table.  A universal protocol timeout was initiated.  Once all in the room were in agreement, the leg was exsanguinated and the tourniquet raised to 300 mmHg.        An incision was made in line with the limb extending from just proximal to the patella, all the way over the patella and down to the tibial tubercle.  This was carried down to subcutaneous tissues and down to the extensor retinaculum.  Flaps were raised medial and lateral.  A median parapatellar incision was then made through the extensor mechanism and the patella was lateralized.  We raised the medial extensor retinaculum off of the tibia in a triangular fashion utilizing sharp dissection.  The infrapatellar fat pad was excised, as was part of the menisci, medial and lateral.  The ACL was then excised.  The knee was brought into flexion and the intramedullary drill was then used to gain access to the intramedullary canal.  The intramedullary guide was then advanced into the femur and a cutting block was applied to the femur.  This was set to take 10 mm of distal femur in 5 degrees of valgus.  The cutting guide was then applied to the femur and the distal femoral osteotomy was performed.  The knee was brought into extreme flexion at this point and the  epicondylar axis was measured in 3 degrees of external rotation.  These were drilled.  A size 5 cutting block was then applied.  We then downsized to a size 4 cutting block, as we felt we had more to take off of the anterior femur.  Once this was accomplished, we recut the anterior cut and the anterior chamfer cut, the block was then removed.  The outrigger guide was applied to the tibia with a PCL retractor placed posterior to the PCL.  The tibia was brought forward.  The outrigger guide was then used to set the cutting block on the tibia, taking approximately 4 mm off of the medial side.  The posterior slope was adjusted and the drop beck was then used to confirm appropriate alignment with the ankle.  The osteotomy was then performed to the proximal tibia.  Once this was accomplished, the tibia was sized to a size 5 tibial component.  We then instrumented the tibia, punching for a press-fit component to the proximal tibia.  Once this was accomplished, the retractors were removed.  The knee was brought into extension and the patella was everted.  We measured this to be 22 mm in thickness, removing approximately 10 mm in thickness with the saw.  We then sized this to a size 38 asymmetric patella.  This was then drilled.  Once this was accomplished, the wound was copiously irrigated.  Exparel liposomal bupivacaine was then injected.  The PCL retractor was placed as well as medial and lateral retractors.  The tibial implant was then impacted into place.  Once this was accomplished, the PCL retractor was removed.  The femoral component was impacted into place.  Once this was accomplished, the 9 mm polyethylene trial was impacted into place.  The knee was stable, had excellent range of motion.  We then press-fit the patellar component in place.        The wound was copiously irrigated yet one final time and the extensor retinaculum was then closed with Then #1 Vicryl in a figure-of-eight fashion.  A 2-0 Vicryl was used  in subcutaneous skin and staples were used on the skin.  An Aquacel dressing was applied as was a sterile dressing and an Ace wrap.  She was also placed in a knee immobilizer, as she had an adductor canal block.  She will be in a total knee protocol in physical therapy postoperative week 1 and 2.        Final implants were a size 4 Triathlon femoral component, size 5 Triathlon tibial component, a 9 mm cruciate stabilized polyethylene liner and a 38 asymmetric press-fit patellar component.         NEEL FULTON MD             D: 2020   T: 2020   MT: GENARO      Name:     CELINA MANSFIELD   MRN:      [id number]        Account:        [id number]   :      1960           Procedure Date: 2020      Document: [id number]

## 2020-11-11 NOTE — ANESTHESIA PROCEDURE NOTES
Procedure note : Adductor canal      Staff -   CRNA: Lucio Siddiqui APRN CRNA  Performed By: CRNA  Pre-Procedure    Location: OR    Procedure Times:11/11/2020 8:08 AM and 11/11/2020 8:13 AM  Pre-Anesthestic Checklist: patient identified, IV checked, site marked, risks and benefits discussed, informed consent, monitors and equipment checked, pre-op evaluation, at physician/surgeon's request and post-op pain management    Timeout  Correct Patient: Yes   Correct Procedure: Yes   Correct Site: Yes   Correct Laterality: Yes   Correct Position: Yes   Site Marked: Yes   .   Procedure Documentation    .    Procedure: Adductor canal, right.   Patient Position:supine   Ultrasound used to identify targeted nerve, plexus, or vascular marker and placed a needle adjacent to it., Ultrasound was used to visualize the spread of the anesthetic in close proximity to the above stated nerve. A permanent image is entered into the patient's record.  Patient Prep/Sterile Barriers; chlorhexidine gluconate and isopropyl alcohol.  .  Needle: insulated   Needle Gauge: 20.    Needle Length (Inches) 4   Insertion Method: Single Shot.        Assessment/Narrative  Paresthesias: No.  .  The placement was negative for: blood aspirated.  .

## 2020-11-11 NOTE — PLAN OF CARE
"  Most recent vitals: /77   Pulse 71   Temp 98  F (36.7  C) (Temporal)   Resp 16   Ht 1.721 m (5' 7.75\")   Wt 93 kg (205 lb)   SpO2 96%   BMI 31.40 kg/m      Patient is alert and oriented x 4. Afebrile. Anxious, but cooperative with cares, is able to make needs known. Patient blood pressure slightly elevated, 160s-170s systolic. Bradycardic at admission as well but has improved as shift progressed. HR in 70s. Pain increasing as shift progressed. Did receive nerve block prior to admission. Patient did have some numbness at tingling at beginning of shift, but has since worn off mostly. CMS intact. Pain has been about a 5-6/10. Received PRN dilaudid x 1, PRN oxycodone x 1, and PRN ibuprofen x 1.   Patient did ambulate the hallway with PT. Also moved around in room a little bit. Assist x 1-2. Did sit up in chair and ordered a meal. Tolerated well. Denies any nausea,vomiting. Voiding without any difficulty.   Dressing to right knee is clean, dry, and intact. No drainage, shadowing noted. TARUN incision d/t dressing. Ice to right knee continuously. Has d5 1/2 nS at 75 ml/hr. Call light in reach, bed in low position, alarms activated.       Face to face report given with opportunity to observe patient.    Report given to CRISS Baron.     Inge Samuels RN   11/11/2020  3:30  PM        "

## 2020-11-11 NOTE — PLAN OF CARE
"Notified MD Lopez. Pt blood pressure has been elevated. Last B/P was 172/77. Asymptomatic. States her blood pressure has been \"high\" for quite sometime. Follows cardiologist.   No new orders at this time.     Patient also does not take her valium as ordered. Would like to take 5 mg vs 10 mg PO for anxiety.   Also would like riki patch.     Nicotine patch ordered. Valium decreased.   "

## 2020-11-12 ENCOUNTER — APPOINTMENT (OUTPATIENT)
Dept: PHYSICAL THERAPY | Facility: HOSPITAL | Age: 60
End: 2020-11-12
Attending: ORTHOPAEDIC SURGERY
Payer: COMMERCIAL

## 2020-11-12 ENCOUNTER — APPOINTMENT (OUTPATIENT)
Dept: OCCUPATIONAL THERAPY | Facility: HOSPITAL | Age: 60
End: 2020-11-12
Attending: ORTHOPAEDIC SURGERY
Payer: COMMERCIAL

## 2020-11-12 LAB
COPATH REPORT: NORMAL
GLUCOSE SERPL-MCNC: 112 MG/DL (ref 70–99)

## 2020-11-12 PROCEDURE — 82947 ASSAY GLUCOSE BLOOD QUANT: CPT | Performed by: ORTHOPAEDIC SURGERY

## 2020-11-12 PROCEDURE — 250N000011 HC RX IP 250 OP 636: Performed by: ORTHOPAEDIC SURGERY

## 2020-11-12 PROCEDURE — 250N000013 HC RX MED GY IP 250 OP 250 PS 637: Performed by: ORTHOPAEDIC SURGERY

## 2020-11-12 PROCEDURE — 36415 COLL VENOUS BLD VENIPUNCTURE: CPT | Performed by: ORTHOPAEDIC SURGERY

## 2020-11-12 PROCEDURE — 97110 THERAPEUTIC EXERCISES: CPT | Mod: GP | Performed by: PHYSICAL THERAPIST

## 2020-11-12 PROCEDURE — 120N000001 HC R&B MED SURG/OB

## 2020-11-12 PROCEDURE — 999N000157 HC STATISTIC RCP TIME EA 10 MIN

## 2020-11-12 PROCEDURE — 250N000013 HC RX MED GY IP 250 OP 250 PS 637: Performed by: INTERNAL MEDICINE

## 2020-11-12 PROCEDURE — 97165 OT EVAL LOW COMPLEX 30 MIN: CPT | Mod: GO

## 2020-11-12 PROCEDURE — 97530 THERAPEUTIC ACTIVITIES: CPT | Mod: GP | Performed by: PHYSICAL THERAPIST

## 2020-11-12 PROCEDURE — 97530 THERAPEUTIC ACTIVITIES: CPT | Mod: GO

## 2020-11-12 PROCEDURE — 97530 THERAPEUTIC ACTIVITIES: CPT | Mod: GP

## 2020-11-12 PROCEDURE — 97110 THERAPEUTIC EXERCISES: CPT | Mod: GP

## 2020-11-12 RX ADMIN — HYDROMORPHONE HYDROCHLORIDE 0.5 MG: 1 INJECTION, SOLUTION INTRAMUSCULAR; INTRAVENOUS; SUBCUTANEOUS at 13:59

## 2020-11-12 RX ADMIN — HYDROMORPHONE HYDROCHLORIDE 0.5 MG: 1 INJECTION, SOLUTION INTRAMUSCULAR; INTRAVENOUS; SUBCUTANEOUS at 16:40

## 2020-11-12 RX ADMIN — METHOCARBAMOL 750 MG: 750 TABLET, FILM COATED ORAL at 02:25

## 2020-11-12 RX ADMIN — ASPIRIN 325 MG: 325 TABLET, COATED ORAL at 08:32

## 2020-11-12 RX ADMIN — DIAZEPAM 5 MG: 5 TABLET ORAL at 20:50

## 2020-11-12 RX ADMIN — IBUPROFEN 600 MG: 600 TABLET ORAL at 11:50

## 2020-11-12 RX ADMIN — NICOTINE 1 PATCH: 21 PATCH, EXTENDED RELEASE TRANSDERMAL at 16:40

## 2020-11-12 RX ADMIN — OXYCODONE HYDROCHLORIDE 15 MG: 5 TABLET ORAL at 04:57

## 2020-11-12 RX ADMIN — DOCUSATE SODIUM 100 MG: 100 CAPSULE, LIQUID FILLED ORAL at 08:31

## 2020-11-12 RX ADMIN — PROGESTERONE 100 MG: 100 CAPSULE ORAL at 20:50

## 2020-11-12 RX ADMIN — DULOXETINE HYDROCHLORIDE 60 MG: 60 CAPSULE, DELAYED RELEASE ORAL at 20:50

## 2020-11-12 RX ADMIN — LISINOPRIL 40 MG: 20 TABLET ORAL at 08:32

## 2020-11-12 RX ADMIN — SENNOSIDES AND DOCUSATE SODIUM 1 TABLET: 8.6; 5 TABLET ORAL at 08:32

## 2020-11-12 RX ADMIN — DULOXETINE HYDROCHLORIDE 60 MG: 60 CAPSULE, DELAYED RELEASE ORAL at 08:32

## 2020-11-12 RX ADMIN — HYDROMORPHONE HYDROCHLORIDE 0.5 MG: 1 INJECTION, SOLUTION INTRAMUSCULAR; INTRAVENOUS; SUBCUTANEOUS at 11:50

## 2020-11-12 RX ADMIN — HYDROMORPHONE HYDROCHLORIDE 0.5 MG: 1 INJECTION, SOLUTION INTRAMUSCULAR; INTRAVENOUS; SUBCUTANEOUS at 08:25

## 2020-11-12 RX ADMIN — OXYCODONE HYDROCHLORIDE 10 MG: 5 TABLET ORAL at 00:01

## 2020-11-12 RX ADMIN — DIAZEPAM 5 MG: 5 TABLET ORAL at 08:33

## 2020-11-12 RX ADMIN — SENNOSIDES AND DOCUSATE SODIUM 1 TABLET: 8.6; 5 TABLET ORAL at 20:50

## 2020-11-12 RX ADMIN — IBUPROFEN 600 MG: 600 TABLET ORAL at 20:53

## 2020-11-12 RX ADMIN — CEFAZOLIN SODIUM 2 G: 2 INJECTION, SOLUTION INTRAVENOUS at 00:00

## 2020-11-12 RX ADMIN — HYDROMORPHONE HYDROCHLORIDE 0.5 MG: 1 INJECTION, SOLUTION INTRAMUSCULAR; INTRAVENOUS; SUBCUTANEOUS at 19:33

## 2020-11-12 RX ADMIN — DOCUSATE SODIUM 100 MG: 100 CAPSULE, LIQUID FILLED ORAL at 20:54

## 2020-11-12 ASSESSMENT — ACTIVITIES OF DAILY LIVING (ADL)
ADLS_ACUITY_SCORE: 15
ADLS_ACUITY_SCORE: 16
ADLS_ACUITY_SCORE: 15
ADLS_ACUITY_SCORE: 15

## 2020-11-12 NOTE — PLAN OF CARE
"Patient c/o right knee pain. Administration of 0.5 mg Dilaudid and 600 mg Ibuprofen reduced pain. Ambulated in hallway with PT and OT. Patient verbalizes pain but does not appear to have impaired movement. Alert and oriented, makes needs known, uses call light appropriately. Lung sounds clear, uses IS independently, states \"more than 10 per hour.\" Heart rate regular, no edema. Denies chest pain, numbness, tingling. Bowel sounds active. Tolerating regular diet. Voiding in bathroom. Ace wrap in place on right knee, ankle, foot. SCDs and Cryocuff in use. Nicotine patch in place left upper back. IV saline locked right forearm.    Patient vital signs are at baseline: Yes  Patient able to ambulate as they were prior to admission or with assist devices provided by therapies during their stay:  Yes  Patient MUST void prior to discharge:  Yes  Patient able to tolerate oral intake:  Yes  Pain has adequate pain control using Oral analgesics:  No,  Reason:  Patient requested IV.    Face to face report given with opportunity to observe patient.    Report given to CRISS Hauser RN   11/12/2020  4:25 PM   "

## 2020-11-12 NOTE — PROGRESS NOTES
Inpatient Occupational Therapy Evaluation    Name: Ana Pittman MRN# [id number]   Age: 60 year old YOB: 1960     Date of Consultation: 2020  Consultation is requested by: Dr. Bentley  Specific Consultation Request: Status post knee surgery  Primary care provider: Stephanie Sepulveda    General Information:   Onset Date: 2020    History of Current Problem/Admission: Right knee pain, unspecified chronicity [M25.561]    Prior Level of Function: Pt previously independent in ADLs and functional mobility prior to surgery. She has numerous assistive devices, adaptive equipment, and DME if needed. She only used a walker if she over did it.   Ambulation: 0 - Independent   Transferrin - Independent   Toiletin - Assistive Equipment    Bathin - Assistive Equipment   Dressin - Independent   Eatin - Independent   Communication: 0 - Understands/communicates without difficulty  Swallowin - swallows foods/liquids without difficulty  Cognition: 0 - no cognitive issues reported    Fall history within the last 6 months: No    Current Living Situation: Pt lives in a house with her 84 year old father. 0 steps to enter home; pt would only have to go to the basement for laundry (but has neighbor to assist with that if needed) otherwise main level living. Bathroom has a shower/tub combo with grab bars and a higher toilet with grab bars. Pt has 2 different types of chairs she can use, one being an extended tub bench.      Current Equipment Used at Home: Grab bars in shower/tub and near toilet. Has canes, walker, crutches, reacher, sock aid, 2 different shower chairs/benches, motorized wheelchair, motorized scooter     Patient & Family Goals: Return home tomorrow     Past Medical History:   Past Medical History:   Diagnosis Date     Mitral valve regurgitation        Past Surgical History:  Past Surgical History:   Procedure Laterality Date     ARTHROPLASTY KNEE Right 2020     Procedure: RIGHT TOTAL KNEE ARTHROPLASTY;  Surgeon: David Bentley MD;  Location: [facility]     CHOLECYSTECTOMY       GYN SURGERY       ORTHOPEDIC SURGERY       RELEASE CARPAL TUNNEL Left 5/19/2015    Procedure: RELEASE CARPAL TUNNEL;  Surgeon: Cali Calvert MD;  Location: [facility]     RELEASE CARPAL TUNNEL Right 7/1/2015    Procedure: RELEASE CARPAL TUNNEL;  Surgeon: Cali Calvert MD;  Location: [facility]       Medications:   Current Facility-Administered Medications   Medication     aspirin (ASA) EC tablet 325 mg     benzocaine-menthol (CEPACOL) 15-3.6 MG lozenge 1 lozenge     bisacodyl (DULCOLAX) Suppository 10 mg     dextrose 5% and 0.45% NaCl infusion     diazepam (VALIUM) tablet 5 mg     diphenhydrAMINE (BENADRYL) capsule 25 mg     docusate sodium (COLACE) capsule 100 mg     DULoxetine (CYMBALTA) DR capsule 60 mg     HYDROmorphone (PF) (DILAUDID) injection 0.5 mg     ibuprofen (ADVIL/MOTRIN) tablet 600 mg     lidocaine (LMX4) kit     lidocaine 1 % 0.1-1 mL     lisinopril (ZESTRIL) tablet 40 mg     magnesium hydroxide (MILK OF MAGNESIA) suspension 30 mL     methocarbamol (ROBAXIN) tablet 750 mg     nicotine (NICODERM CQ) 21 MG/24HR 24 hr patch 1 patch     nicotine Patch in Place     ondansetron (ZOFRAN-ODT) ODT tab 4 mg    Or     ondansetron (ZOFRAN) injection 4 mg     oxyCODONE (ROXICODONE) tablet 10 mg    Or     oxyCODONE (ROXICODONE) tablet 15 mg     polyethylene glycol (MIRALAX) Packet 17 g     pravastatin (PRAVACHOL) tablet 20 mg     prochlorperazine (COMPAZINE) injection 10 mg    Or     prochlorperazine (COMPAZINE) tablet 10 mg     progesterone (PROMETRIUM) capsule 100 mg     senna-docusate (SENOKOT-S/PERICOLACE) 8.6-50 MG per tablet 1 tablet     sodium chloride (PF) 0.9% PF flush 3 mL     sodium chloride (PF) 0.9% PF flush 3 mL     sodium phosphate (FLEET ENEMA) 1 enema       Weight Bearing Status: WBAT     Cognitive Status Examination:  Orientation: awake and alert  Level of Consciousness: alert  Follows  Commands and Answers Questions: 100% of the time  Personal Safety and Judgement: Intact  Memory: Immediate recall intact and Long-term memory intact  Comments: Pt very pleasant. No concerns with cognition    Pain:   Currently in pain? Yes  Pain Location? right knee  Pain Ratin at rest, increased with activity but pt then did not rate pain    Occupational Therapy Evaluation:   Integumentary/Edema: post op bandage donned  Range of Motion: BUE's WFL   Strength: BUE's grossly 4 to 5/5  Hand Strength: Bilateral gross grasp good   Muscle Tone Assessment: no issues  Coordination: normal    Mobility:   Transfer Skills: SBA sit-stand from the chair with extra time due to pain  Bed to Chair/Chair to Bed: N/A as pt was already in the chair upon OT arrival  Toilet Transfer: SBA with use of grab bars and mild difficulties    Balance: No LOB     ADLs:   Lower Body Dressing: MaxA would be required today. Pt has sock aid and reachers, and has used previously therefore no concerns  Toileting: IND  Grooming: SBA standing at the sink to wash her hands  Eating/Self Feeding: Set up    IADLs:   Previous Responsibilities of the Patient: Meal Prep, Housekeeping, Laundry, Shopping, Medication Management, Finances, and Driving   Comments: Pt will have nightly meals sent for her and her father from a Religious program. She has friends, family, and neighbor to assist with housekeeping, laundry, shopping, and transportation.      Activities of Daily Living Analysis:   Impairments Contributing to Impaired Activities of Daily Living: pain in R knee, RLE weakness, decreased activity tolerance, R knee ROM deficits    Occupational Therapy Interventions: ADL Retraining , IADL retraining, strengthening , progressive activity/exercise and risk factor education     Clinical Impressions:  Criteria for Skilled Therapeutic Intervention Met: Yes, treatment indicated  OT Diagnosis: Deficits with ADLs s/p R TKA  Influenced by the following impairments: Pain,  RLE weakness, decreased activity tolerance, R knee ROM deficits  Functional limitations due to impairment: Dressing, bathing, toileting, functional mobility, IADLs  Clinical presentation: Stable/Uncomplicated  Clinical presentation rationale: Clinical judgement  Clinical Decision making (complexity): Low Complexity  Frequency: 5 times/week  Predicted Duration of Therapy Intervention (days/wks): 3 days    Anticipated Discharge Disposition: Home with Assist  Anticipated Equipment Needs at Discharge: None  Risks and Benefits of therapy have been explained: Yes  Patient, Family & other staff in agreement with plan of care: Yes  Clinical Impression Comments: Pt presents with OT orders 1 day s/p R TKA. Pt is functioning below her baseline due to pain, RLE weakness, decreased activity tolerance, R knee ROM deficits. Pt would benefit from ongoing skilled OT intervention during her hospital stay to work on maximizing her independence in ADLs for a safe return home.     Total Eval Time: 20

## 2020-11-12 NOTE — PROGRESS NOTES
Assessment completed by face to face with patient.    LOC: alert and oriented    Dx: S/Ptotal knee arthroplasty, Right  Chronic Disease Management: None    Lives with: Father  Living at:  Home in [place]  Transportation: YES , drives independently but will get a ride home from neighbor/friendThomas    Primary PCP: Stephanie Sepulveda  Insurance:  BCBS    Support System:  Family, friends, neighbors  Homecare/PCA: Interested in home PT, [facility] in [place] will be able to start pt over the weekend per .    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    ADLs: Independent  Ambulation:Independent but has access to walker et 2 motorized scooters.  Falls: None in last three months  Nutrition: No concerns voiced    Equipment used: Shower chair available as well as walker, motorized scooters, grab bars all over in home      Oxygen supplier: N/A      Does the supplier have valid oxygen orders: N/A    Mental health: No concerns voiced  Substance abuse: Smokes 1/2-1 ppd cigarettes      Able to Return to Prior Living Arrangements: YES    Choice of Vendor: [facility] for PT    Barriers: None anticipated    GIULIANA: Low    Plan: Return home to [facility] via neighbor

## 2020-11-12 NOTE — PROGRESS NOTES
11/12/20 1200   Signing Clinician's Name / Credentials   Signing clinician's name / credentials Natasha Lemons OTR/L   Quick Adds   Rehab Discipline OT   Therapeutic Activity   Minutes of Treatment 10 minutes   Symptoms Noted During/After Treatment Fatigue;Increased pain   Treatment Detail Pt was educated in use of AE for LB dressing while recovering. Discussed BR equipment that would optimize her safety at home. Pt also completed self-cares including sit to stand from the toilet with SBA and use of grab bars. She stood at the sink and washed her hands with SBA. Pt returned to the chair using FWW with SBA. Pt was left with RT.   OT Discharge Planning    OT Discharge Recommendation (DC Rec) Home with assist   OT Rationale for DC Rec Due to pt's current functional status, good support, and no concerns with home accessibility pending pain well controlled   OT Brief overview of current status  SBA for chair transfers, toilet transfers, grooming standing at the sink, ambulation to/from BR with FWW, rosamaria-cares and clothing management, and MaxA for LB dressing (socks) but pt has equipment at home that she has used previously to assist with LB dressing   Additional Documentation   Rehab Comments Pt is experiencing pain and decreased activity tolerance, however completed most ADLs with SBA   OT Plan Progress safety and independence in ADLs for a safe return home   Total Session Time   Total Session Time (minutes) 10 minutes

## 2020-11-12 NOTE — PROGRESS NOTES
11/12/20 1248   Signing Clinician's Name / Credentials   Signing clinician's name / credentials Joy Katz DPT   Quick Adds   Rehab Discipline PT   Therapeutic Activity   Minutes of Treatment 18 minutes   Symptoms Noted During/After Treatment Increased pain   Treatment Detail Pt transferred sit>stand CGA up to FWW.  Pt ambulated 80' with FWW CGA, demonstrates a step to pattern lacking full knee extension in stance phase.  Provided cues to work on knee extension as well as knee flexion with toe off, some improvements noted with cues.   Therapeutic Exercise   Minutes of Treatment 10   Symptoms Noted During/After Treatment increased pain   Treatment Detail Pt sitting in chair upon PT arrival, complaining of significant pain.  Instructed in seated heel slides, pt only producing motion at hip, not allowing any motion at the knee itself.  Provided cues and some manual assistance but minimal ROM still noted from knee.  Instructed pt in seated LAQ, pt unable to initiate any quad firing initially complaining of pain and substitution noted at hip.  Assisted pt's knee into extension with pt resistive to go into full extension due to pain.  Once in position, pt was able to maintain knee extension unassisted so quads were able to fire.  Pt then able to complete 5 reps of LAQ through partial range unassisted.  Pt instructed to work on quad sets while seated in chair with feet reclined or in bed frequently throughout the day.   PT Discharge Planning    PT Discharge Recommendation (DC Rec) home with home care physical therapy   PT Rationale for DC Rec Pt may not be able to get to outpatient PT based on current pain level and tolerance for mobility, may benefit initially from home PT until pain control and mobility improves   PT Brief overview of current status  Pt transferred sit>stand CGA up to FWW.  Pt ambulated 80' with FWW CGA, demonstrates a step to pattern lacking full knee extension in stance phase.  Provided cues to  work on knee extension as well as knee flexion with toe off, some improvements noted with cues.   Additional Documentation   Rehab Comments significant pain limiting tolerance for knee ROM   PT Plan Continue with ROM, strength, and progress mobility   Total Session Time   Total Session Time (minutes) 28 minutes

## 2020-11-12 NOTE — PLAN OF CARE
VSS, highest temperature 101 at start of shift, pt blankets and socks removed, temperature decreased to 100, pt denies headache/chills. HRR, murmur heard, lungs clear, bowels active. CMS intact in the right leg, dressing CDI, cryo cuff in place and cold. Pt given PO Oxycodone 10 mg and Robaxin given for pain, along with Oxycodone 15 mg  Pt was able to sleep intermittently during the night. Pt up to void in bedside commode 1x this shift, up with a standby assist, gait belt and walker to the bathroom 1x this shift. Pt is alert and oriented x 4.     Face to face report given with opportunity to observe patient.    Report given to Thanh Herrera and THANH Dorantes RN   11/12/2020  7:37 AM

## 2020-11-12 NOTE — PROGRESS NOTES
11/12/20 1422   Signing Clinician's Name / Credentials   Signing clinician's name / credentials Madelaine Norton PTA   Quick Adds   Rehab Discipline PT   PT Assistant Visit Number 1   Therapeutic Activity   Minutes of Treatment 20 minutes   Symptoms Noted During/After Treatment Fatigue;Increased pain   Treatment Detail Pt ambulated 200 feet with FWW standing rest breaks along the way CGA1. Toilet transfer with the use of the FWW and grab bar. Transfer in and out of bed siderail and the trapezee bar on the way out of bed but the use of the siderail only getting back into bed SBA1.    Therapeutic Exercise   Minutes of Treatment 8   Symptoms Noted During/After Treatment fatigue;increased pain   Treatment Detail Supine 10 reps TKA   PT Discharge Planning    PT Discharge Recommendation (DC Rec) home with home care physical therapy   PT Rationale for DC Rec Pt may not be able to get to outpatient PT based on current pain level and tolerance for mobility, may benefit initially from home PT until pain control and mobility improves   PT Brief overview of current status  Pt transferred sit>stand CGA up to FWW.  Pt ambulated 80' with FWW CGA, demonstrates a step to pattern lacking full knee extension in stance phase.  Provided cues to work on knee extension as well as knee flexion with toe off, some improvements noted with cues.   Additional Documentation   PT Plan Continue with ROM, strength, and progress mobility   Total Session Time   Total Session Time (minutes) 28 minutes

## 2020-11-12 NOTE — PLAN OF CARE
Pt A&O, little anxious at start of shift with concerns of pain not well controlled.  Pain control and process improved with 10 mg Roxycodone and 0.5 Dil PRN.  Valium to be given at bedtime.  Placed Nicotine patch this evening.  Rt knee CMS intact, dressing C,D,I with pulses palpable in foot.  Ambulated in harris once and to BR multiple times, with use of walker and gait belt, steady on feet.  Good appetite.  Cryocuff in place.  Call light in reach and alarms in use.   Patient vital signs are at baseline: Yes  Patient able to ambulate as they were prior to admission or with assist devices provided by therapies during their stay:  No,  Reason:  Knee replacement today.  Patient MUST void prior to discharge:  Yes  Patient able to tolerate oral intake:  Yes  Pain has adequate pain control using Oral analgesics:  No,  Reason:  Needing IV pain meds.   Face to face report given with opportunity to observe patient.    Report given to Leroy Lord RN   11/11/2020  11:19 PM

## 2020-11-12 NOTE — PROGRESS NOTES
[facility]    Hospitalist Progress Note    Date of Service (when I saw the patient): 11/12/2020    Assessment & Plan   Ana Pittman is a 60 year old female who was admitted on 11/11/2020 s/p R TKA.    POD #1 s/p R elective TKA: post op care and protocol per orthopedics.    CVS: History of hypertension, mild to moderate aortic stenosis.  Lisinopril his only vasoactive medication.  -Continue as able, already ordered    Anxiety/depression: Continue outpatient Valium, Cymbalta    Chronic pain syndrome: Patient is on Suboxone at baseline.  -Patient has oxycodone ordered for pain control here    DVT Prophylaxis: Defer to primary service    Code Status: Full Code    Disposition: Medically stable, discharge per ortho.    Rayray Lopez MD      Interval History   Patient seen in room.  Doing well.  Operative pain controlled.  Denies cp, sob, abdominal pain, nausea.    -Data reviewed today: I reviewed all new labs and imaging results over the last 24 hours. I personally reviewed imaging reports.    Physical Exam   Temp: 100  F (37.8  C) Temp src: Tympanic BP: 149/66 Pulse: 80   Resp: 16 SpO2: 97 % O2 Device: None (Room air) Oxygen Delivery: 2 LPM  Vitals:    11/11/20 0724   Weight: 93 kg (205 lb)     Vital Signs with Ranges  Temp:  [97.4  F (36.3  C)-101  F (38.3  C)] 100  F (37.8  C)  Pulse:  [48-83] 80  Resp:  [5-20] 16  BP: (129-178)/() 149/66  SpO2:  [91 %-100 %] 97 %      Intake/Output Summary (Last 24 hours) at 11/12/2020 0849  Last data filed at 11/11/2020 2256  Gross per 24 hour   Intake 2600 ml   Output 300 ml   Net 2300 ml       Peripheral IV 11/11/20 Right Lower forearm (Active)   Site Assessment WDL 11/12/20 0835   Line Status Saline locked 11/12/20 0835   Dressing Intervention Dressing reinforced 11/11/20 1551   Phlebitis Scale 0-->no symptoms 11/12/20 0835   Infiltration Scale 0 11/12/20 0835   Infiltration Site Treatment Method  None 11/11/20 0726   If infiltrated, was a Vesicant infusing?  No 11/11/20 0726   Number of days: 1       Incision/Surgical Site 11/11/20 Right Knee (Active)   Incision Assessment UTV 11/12/20 0000   Ada-Incision Assessment Maceration 11/11/20 1900   Closure TARUN 11/11/20 1900   Incision Drainage Amount Other (Comment) 11/11/20 1900   Dressing Intervention Clean, dry, intact 11/12/20 0214   Number of days: 1       Constitutional - AA, NAD  HEENT - atraumatic, normocephalic  Neck - supple, no masses, no JVD  CVS - S1 S2 RRR, 3/6 systolic murmur, no rubs or gallops  Respiratory - CTA b/l  GI - soft, NT, ND, + bowel sounds, no organomegaly  Musculoskeletal - no LE edema, no lesions  Neuro - oriented x 3, no gross focal deficits      Medications     dextrose 5% and 0.45% NaCl 75 mL/hr at 11/11/20 2340       aspirin  325 mg Oral Daily     diazepam  5 mg Oral BID     docusate sodium  100 mg Oral BID     DULoxetine  60 mg Oral BID     lisinopril  40 mg Oral Daily     nicotine  1 patch Transdermal Daily     nicotine   Transdermal Q8H     polyethylene glycol  17 g Oral Daily     pravastatin  20 mg Oral Daily     progesterone  100 mg Oral At Bedtime     senna-docusate  1 tablet Oral BID     sodium chloride (PF)  3 mL Intracatheter Q8H       Data   Recent Labs   Lab 11/12/20  0535   *          Recent Results (from the past 24 hour(s))   XR Knee Port Right 1/2 Views    Narrative    PROCEDURE: XR KNEE PORT RT 1/2 VW 11/11/2020 11:03 AM    HISTORY: post right total knee    COMPARISONS: 8/10/2020.    TECHNIQUE: 2 views.    FINDINGS: There is a right knee arthroplasty with components in  satisfactory position. Skin staples are seen anteriorly.    No acute bony abnormality is seen.         Impression    IMPRESSION: Right knee arthroplasty.    MD Rayray FROST MD

## 2020-11-13 ENCOUNTER — APPOINTMENT (OUTPATIENT)
Dept: PHYSICAL THERAPY | Facility: HOSPITAL | Age: 60
End: 2020-11-13
Attending: ORTHOPAEDIC SURGERY
Payer: COMMERCIAL

## 2020-11-13 VITALS
WEIGHT: 205 LBS | OXYGEN SATURATION: 96 % | HEART RATE: 89 BPM | SYSTOLIC BLOOD PRESSURE: 126 MMHG | RESPIRATION RATE: 16 BRPM | HEIGHT: 68 IN | TEMPERATURE: 98.8 F | BODY MASS INDEX: 31.07 KG/M2 | DIASTOLIC BLOOD PRESSURE: 41 MMHG

## 2020-11-13 LAB — HGB BLD-MCNC: 11.8 G/DL (ref 11.7–15.7)

## 2020-11-13 PROCEDURE — 97530 THERAPEUTIC ACTIVITIES: CPT | Mod: GP | Performed by: PHYSICAL THERAPIST

## 2020-11-13 PROCEDURE — 36415 COLL VENOUS BLD VENIPUNCTURE: CPT | Performed by: ORTHOPAEDIC SURGERY

## 2020-11-13 PROCEDURE — 250N000011 HC RX IP 250 OP 636: Performed by: INTERNAL MEDICINE

## 2020-11-13 PROCEDURE — 250N000013 HC RX MED GY IP 250 OP 250 PS 637: Performed by: INTERNAL MEDICINE

## 2020-11-13 PROCEDURE — 250N000013 HC RX MED GY IP 250 OP 250 PS 637: Performed by: ORTHOPAEDIC SURGERY

## 2020-11-13 PROCEDURE — 97110 THERAPEUTIC EXERCISES: CPT | Mod: GP | Performed by: PHYSICAL THERAPIST

## 2020-11-13 PROCEDURE — 85018 HEMOGLOBIN: CPT | Performed by: ORTHOPAEDIC SURGERY

## 2020-11-13 RX ORDER — ASPIRIN 325 MG
325 TABLET, DELAYED RELEASE (ENTERIC COATED) ORAL 2 TIMES DAILY
Qty: 60 TABLET | Refills: 0 | Status: SHIPPED | OUTPATIENT
Start: 2020-11-13

## 2020-11-13 RX ORDER — KETOROLAC TROMETHAMINE 30 MG/ML
60 INJECTION, SOLUTION INTRAMUSCULAR; INTRAVENOUS ONCE
Status: COMPLETED | OUTPATIENT
Start: 2020-11-13 | End: 2020-11-13

## 2020-11-13 RX ORDER — OXYCODONE HYDROCHLORIDE 10 MG/1
10 TABLET ORAL
Qty: 12 TABLET | Refills: 0 | Status: SHIPPED | OUTPATIENT
Start: 2020-11-13

## 2020-11-13 RX ADMIN — NICOTINE 1 PATCH: 21 PATCH, EXTENDED RELEASE TRANSDERMAL at 08:19

## 2020-11-13 RX ADMIN — OXYCODONE HYDROCHLORIDE 15 MG: 5 TABLET ORAL at 16:34

## 2020-11-13 RX ADMIN — DULOXETINE HYDROCHLORIDE 60 MG: 60 CAPSULE, DELAYED RELEASE ORAL at 08:06

## 2020-11-13 RX ADMIN — OXYCODONE HYDROCHLORIDE 15 MG: 5 TABLET ORAL at 12:51

## 2020-11-13 RX ADMIN — ASPIRIN 325 MG: 325 TABLET, COATED ORAL at 08:18

## 2020-11-13 RX ADMIN — OXYCODONE HYDROCHLORIDE 15 MG: 5 TABLET ORAL at 08:06

## 2020-11-13 RX ADMIN — LISINOPRIL 40 MG: 20 TABLET ORAL at 08:18

## 2020-11-13 RX ADMIN — PRAVASTATIN SODIUM 20 MG: 20 TABLET ORAL at 08:18

## 2020-11-13 RX ADMIN — DIAZEPAM 5 MG: 5 TABLET ORAL at 16:35

## 2020-11-13 RX ADMIN — DIAZEPAM 5 MG: 5 TABLET ORAL at 08:06

## 2020-11-13 RX ADMIN — METHOCARBAMOL 750 MG: 750 TABLET, FILM COATED ORAL at 00:47

## 2020-11-13 RX ADMIN — KETOROLAC TROMETHAMINE 60 MG: 30 INJECTION, SOLUTION INTRAMUSCULAR at 14:39

## 2020-11-13 ASSESSMENT — ACTIVITIES OF DAILY LIVING (ADL)
ADLS_ACUITY_SCORE: 16
ADLS_ACUITY_SCORE: 15

## 2020-11-13 NOTE — PLAN OF CARE
Patient pain of 5/10 reduced to 3/10 through cryo cuff and 15 mg Roxicodone. Alert and oriented, makes needs known. Vital signs stable. Vital signs:  Temp: 99  F (37.2  C) Temp src: Temporal BP: 133/65 Pulse: 77   Resp: 16 SpO2: 95 %      Lung sounds clear, heart rate regular, murmur heard, denies chest pain, numbness, tingling. Bowel sounds active, tolerating regular diet. Up independent to bathroom to void. Ace wrap and padding removed right knee. Aquacel in place, small areas of shadowing marked. Slight swelling of right knee and calf. Saline locked IV removed. Anticipating discharge this afternoon.    Patient vital signs are at baseline: Yes  Patient able to ambulate as they were prior to admission or with assist devices provided by therapies during their stay:  Yes  Patient MUST void prior to discharge:  Yes  Patient able to tolerate oral intake:  Yes  Pain has adequate pain control using Oral analgesics:  Yes     Face to face report given with opportunity to observe patient.    Report given to CRISS Hauser RN   11/13/2020  3:13 PM

## 2020-11-13 NOTE — PLAN OF CARE
Occupational Therapy Discharge Summary    Reason for therapy discharge:    Discharged to home with assist.    Progress towards therapy goal(s). See goals on Care Plan in [facility] electronic health record for goal details.  Goals partially met.  Barriers to achieving goals:   discharge from facility.    Therapy recommendation(s):    No further therapy is recommended.

## 2020-11-13 NOTE — PROGRESS NOTES
[facility]    Hospitalist Progress Note    Date of Service (when I saw the patient): 11/13/2020    Assessment & Plan   Ana Pittman is a 60 year old female who was admitted on 11/11/2020 s/p R TKA.    POD #2 s/p R elective TKA: post op care and protocol per orthopedics.    CVS: History of hypertension, mild to moderate aortic stenosis.  Lisinopril his only vasoactive medication.  -Continue as able, already ordered    Anxiety/depression: Continue outpatient Valium, Cymbalta    Chronic pain syndrome: Patient is on Suboxone at baseline.  -Patient has oxycodone ordered for pain control here    DVT Prophylaxis: Defer to primary service    Code Status: Full Code    Disposition: Medically stable, discharge per ortho.    Rayray Lopez MD      Interval History   Patient seen in room.  Doing very well.  Operative pain controlled.  Denies cp, sob, abdominal pain, nausea.    -Data reviewed today: I reviewed all new labs and imaging results over the last 24 hours. I personally reviewed imaging reports.    Physical Exam   Temp: 99  F (37.2  C) Temp src: Temporal BP: 133/65 Pulse: 77   Resp: 16 SpO2: 95 % O2 Device: None (Room air)    Vitals:    11/11/20 0724   Weight: 93 kg (205 lb)     Vital Signs with Ranges  Temp:  [99  F (37.2  C)-101.1  F (38.4  C)] 99  F (37.2  C)  Pulse:  [77-84] 77  Resp:  [16] 16  BP: (133-162)/(59-76) 133/65  SpO2:  [94 %-97 %] 95 %    No intake or output data in the 24 hours ending 11/13/20 0931      Peripheral IV 11/11/20 Right Lower forearm (Active)   Site Assessment WDL 11/13/20 0813   Line Status Saline locked 11/13/20 0813   Dressing Intervention Dressing reinforced 11/11/20 1551   Phlebitis Scale 0-->no symptoms 11/13/20 0813   Infiltration Scale 0 11/13/20 0813   Infiltration Site Treatment Method  None 11/11/20 0726   If infiltrated, was a Vesicant infusing? No 11/11/20 0726   Number of days: 2       Incision/Surgical Site 11/11/20 Right Knee (Active)   Incision Assessment UTV  11/13/20 0051   Ada-Incision Assessment UTV 11/13/20 0051   Closure TARUN 11/13/20 0051   Incision Drainage Amount Other (Comment) 11/11/20 1900   Dressing Intervention Clean, dry, intact 11/13/20 0051   Number of days: 2       Constitutional - AA, NAD  HEENT - atraumatic, normocephalic  Neck - supple, no masses, no JVD  CVS - S1 S2 RRR, 3/6 systolic murmur, no rubs or gallops  Respiratory - CTA b/l  GI - soft, NT, ND, + bowel sounds, no organomegaly  Musculoskeletal - no LE edema, no lesions  Neuro - oriented x 3, no gross focal deficits      Medications     dextrose 5% and 0.45% NaCl 75 mL/hr at 11/11/20 2340       aspirin  325 mg Oral Daily     diazepam  5 mg Oral BID     docusate sodium  100 mg Oral BID     DULoxetine  60 mg Oral BID     lisinopril  40 mg Oral Daily     nicotine  1 patch Transdermal Daily     nicotine   Transdermal Q8H     polyethylene glycol  17 g Oral Daily     pravastatin  20 mg Oral Daily     progesterone  100 mg Oral At Bedtime     senna-docusate  1 tablet Oral BID     sodium chloride (PF)  3 mL Intracatheter Q8H       Data   Recent Labs   Lab 11/13/20  0549 11/12/20  0535 11/10/20   HGB 11.8  --   --    POTASSIUM  --   --  4.0   CR  --   --  0.82   GLC  --  112* 91   ALT  --   --  23   AST  --   --  19          No results found for this or any previous visit (from the past 24 hour(s)).    Rayray Lopez MD

## 2020-11-13 NOTE — PROGRESS NOTES
Spoke w/Mable  for [facility], they can have a nurse plan to see pt as early as tomorrow.  PT to start next week.  This writer also spoke to Syd[facility], [facility] to request discharge orders as pt will need a face to face [facility] provider for Homecare Nursing et PT to start tomorrow (11/14/2020).

## 2020-11-13 NOTE — PROGRESS NOTES
Physical Therapy Discharge Summary    Reason for therapy discharge:    Discharged to home with home therapy.    Progress towards therapy goal(s). See goals on Care Plan in [facility] electronic health record for goal details.  Goals met    Therapy recommendation(s):    Continued therapy is recommended.  Rationale/Recommendations:  in home therapy or outpatient.

## 2020-11-13 NOTE — PLAN OF CARE
Pt alert and orientated.  Ate well for supper.  Up in room  using walker.  Tolerated. Well. Cyrocuff on right knee. Cms good.  Pt prefers dilaudid for pain-  8/10.  Face to face report given with opportunity to observe patient.    Report given to osman Weaver RN   11/13/2020  12:24 AM

## 2020-11-13 NOTE — PHARMACY-MEDICATION REGIMEN REVIEW
"Pain Management Note-Pharmacy    Subjective/Objective:  Patient on Suboxone PTA for chronic pain    Acute Pain Management for Patients Receiving Maintenance Methadone or Buprenorphine Therapy:       Per UpToDate (Suboxone)  \"Acute pain: When using buprenorphine for treatment of opioid use disorder, treat acute pain with nonopioid analgesics whenever possible. If treatment with a high-affinity full opioid analgesic is required, monitor closely for respiratory depression because high doses may be necessary to achieve pain relief.\"    Assessment/Plan:  1. Prescribe Narcan pen for home use if short acting opioids are being prescribed &  If home Suboxone to be resumed  2. Schedule Tylenol  3. Schedule Ibuprofen    Rajendra Chopra, PharmD on  11/13/2020     "

## 2020-11-13 NOTE — PLAN OF CARE
Called [facility] in [place] regarding discharge orders from Dr. Bentley - awaiting call back

## 2020-11-13 NOTE — PROGRESS NOTES
11/13/20 0858   Signing Clinician's Name / Credentials   Signing clinician's name / credentials Joy Katz DPT   Quick Adds   Rehab Discipline PT   Therapeutic Activity   Minutes of Treatment 12 minutes   Symptoms Noted During/After Treatment Fatigue;Increased pain   Treatment Detail Pt up at EOB upon PT arrival.  Pt states she hasn't had any pain meds but is agreeable to PT.  Pt transferred SBA sit>stand up to FWW.  Pt ambulated 150' with FWW SBA, initially demonstrating step-to pattern with slow progression due to pain but was able to progress to step-through as mobility and pain improved.  Improved knee extension in stance phase noted today.  Pt able to complete toilet transfer mod I.     Therapeutic Exercise   Minutes of Treatment 10   Symptoms Noted During/After Treatment increased pain   Treatment Detail Pt completed seated knee flexion x5 with improved motion compared to yesterday.  Pt completed seated LAQ x5 with no assist required, did provide cues to work on full knee extension vs hip flexion to obtain final motion.  Discussed importance of completing HEP 2x/day and importance of icing.  Iceman placed on pt at end of session.   PT Discharge Planning    PT Discharge Recommendation (DC Rec) home with home care physical therapy   PT Rationale for DC Rec Pt may not be able to get to outpatient PT based on current pain level and tolerance for mobility, may benefit initially from home PT until pain control and mobility improves   PT Brief overview of current status  Pt up at EOB upon PT arrival.  Pt states she hasn't had any pain meds but is agreeable to PT.  Pt transferred SBA sit>stand up to FWW.  Pt ambulated 150' with FWW SBA, initially demonstrating step-to pattern with slow progression due to pain but was able to progress to step-through as mobility and pain improved.  Improved knee extension in stance phase noted today.  Pt able to complete toilet transfer mod I.     Additional Documentation    Rehab Comments reporting significant pain but able to tolerate mobility without deficits today   PT Plan Pt safe to DC from PT standpoint, will continue with ROM/strength during inpatient stay.   Total Session Time   Total Session Time (minutes) 22 minutes

## 2020-11-13 NOTE — PLAN OF CARE
VSS, temp 99.8 temporally, HRR - murmur detected, lungs clear, bowels active. CMS intact in right leg, pulses equal 2+. Pt did report pain at start of shift, requested Robaxin, medicatiobn given with relief. Pt was able to sleep throughout the night.    Face to face report given with opportunity to observe patient.    Report given to CRISS Herrera and CRISS Dorantes RN   11/13/2020  8:36 AM

## 2020-11-13 NOTE — PROGRESS NOTES
This writer spoke to Dr. Mc Otoole's  at [facility] today plus had left 2 msgs that we are needing discharge orders for pt.  Dr. Lopez was gracious enough to put in orders for homecare for pt as she is requesting this for home.  Homecare services for PT have been coordinated since yesterday /[facility] homecare out of [place].

## 2020-11-13 NOTE — DISCHARGE INSTRUCTIONS
Pt to follow-up in 10-14 days at  [facility]  Leave Aquacel dressing in place  OK to shower with Aquacel in place  WBAT with ambulatory assist devices    Follow up appointment with Dr. Bentley   Wednesday, November 18 at 1:15 p.m.   [facility]   [phone]

## 2020-11-14 NOTE — PLAN OF CARE
Patient discharged at 1650  PM via wheel chair accompanied by other:friend  and staff. Prescriptions sent to patients preferred pharmacy. All belongings sent with patient.     Discharge instructions reviewed with pt. Listed belongings gathered and returned to patient. yes    Patient discharged to home.   Report called to na    Core Measures and Vaccines  Core Measures applicable during stay: Yes. If yes, state diagnosis: na  Pneumonia and Influenza given prior to discharge, if indicated: N/A    Surgical Patient   Surgical Procedures during stay: right total knee  Did patient receive discharge instruction on wound care and recognition of infection symptoms? Yes    MISC  Follow up appointment made:  Yes  Home and hospital aquired medications returned to patient: N/A  Patient reports pain was well managed at discharge: Yes

## 2020-11-23 ENCOUNTER — DOCUMENTATION ONLY (OUTPATIENT)
Dept: OTHER | Facility: CLINIC | Age: 60
End: 2020-11-23

## 2021-02-14 NOTE — DISCHARGE SUMMARY
[facility]    Discharge Summary  Hospitalist    Date of Admission:  11/11/2020  Date of Discharge:  11/13/2020  5:32 PM  Discharging Provider: Rayray Lopez MD  Date of Service (when I saw the patient): 02/14/21    Discharge Diagnoses   Active Problems:    S/P total knee arthroplasty, right (11/11/2020)      History of Present Illness   Ana Pittman is an 60 year old female who presented with s/p R TKA.  Please see admission H+P for additional details.    Hospital Course   Ana Pittman was admitted on 11/11/2020 for s/p R TKA    Rayray Lopez MD    Significant Results and Procedures       Pending Results   These results will be followed up by Stephanie Sepulveda    Unresulted Labs Ordered in the Past 30 Days of this Admission     No orders found from 10/12/2020 to 11/12/2020.          Code Status     Primary Care Physician   Stephanie Sepulveda    Physical Exam                     Vitals:    11/11/20 0724   Weight: 93 kg (205 lb)     Vital Signs with Ranges     No intake/output data recorded.      Discharge Disposition       Consultations This Hospital Stay   HOSPITALIST IP CONSULT  SOCIAL WORK IP CONSULT  PHYSICAL THERAPY ADULT IP CONSULT  OCCUPATIONAL THERAPY ADULT IP CONSULT    Time Spent on this Encounter       Discharge Orders      Home care nursing referral      Home Care PT Referral for Hospital Discharge      MD face to face encounter    Documentation of Face to Face and Certification for Home Health Services    I certify that patient: Ana Pittman is under my care and that I, or a nurse practitioner or physician's assistant working with me, had a face-to-face encounter that meets the physician face-to-face encounter requirements with this patient on: November 13, 2020.    This encounter with the patient was in whole, or in part, for the following medical condition, which is the primary reason for home health care: R TKA.    I certify that, based on my findings, the following  services are medically necessary home health services: Nursing, Occupational Therapy and Physical Therapy.    My clinical findings support the need for the above services because: Nurse is needed: For complex aftercare of surgical procedures because the patient needs instruction and cannot perform care on their own due to: s/p TKA.., Occupational Therapy Services are needed to assess and treat cognitive ability and address ADL safety due to impairment in s/p TKA. and Physical Therapy Services are needed to assess and treat the following functional impairments: s/p TKA.    Further, I certify that my clinical findings support that this patient is homebound (i.e. absences from home require considerable and taxing effort and are for medical reasons or [employer] services or infrequently or of short duration when for other reasons) because: Requires assistance of another person or specialized equipment to access medical services because patient: Requires supervision of another for safe transfer...    Based on the above findings. I certify that this patient is confined to the home and needs intermittent skilled nursing care, physical therapy and/or speech therapy.  The patient is under my care, and I have initiated the establishment of the plan of care.  This patient will be followed by a physician who will periodically review the plan of care.  Physician/Provider to provide follow up care: Stephanie Sepulveda    Attending hospital physician (the Medicare certified [facility] provider): Rayray Lopez MD  Physician Signature: See electronic signature associated with these discharge orders.  Date: 11/13/2020     Reason for your hospital stay    R TKA     Follow-up and recommended labs and tests     Follow up [place] [facility] in 2 weeks  Follow up with primary care provider, Stephanie Sepulveda, within 7 days to evaluate after surgery.  No follow up labs or test are needed.     Activity    Your activity upon discharge: activity as  tolerated     Full Code     [facility] DME    DME Documentation: Describe the reason for need to support medical necessity: Impaired gait status post knee surgery. I, the undersigned, certify that the above prescribed supplies are medically necessary for this patient and is both reasonable and necessary in reference to accepted standards of medical practice in the treatment of this patient's condition and is not prescribed as a convenience.     [facility] D[facility]    DME Documentation: Describe the reason for need to support medical necessity: Impaired gait status post knee surgery. I, the undersigned, certify that the above prescribed supplies are medically necessary for this patient and is both reasonable and necessary in reference to accepted standards of medical practice in the treatment of this patient's condition and is not prescribed as a convenience.     [facility]    DME Documentation: Describe the reason for need to support medical necessity: Impaired gait status post knee surgery. I, the undersigned, certify that the above prescribed supplies are medically necessary for this patient and is both reasonable and necessary in reference to accepted standards of medical practice in the treatment of this patient's condition and is not prescribed as a convenience.     Diet    Follow this diet upon discharge: Orders Placed This Encounter      Advance Diet as Tolerated: Regular Diet Adult     Discharge Medications   Discharge Medication List as of 11/13/2020  4:40 PM      START taking these medications    Details   aspirin (ASA) 325 MG EC tablet Take 1 tablet (325 mg) by mouth 2 times daily, Disp-60 tablet, R-0, E-Prescribe      oxyCODONE (ROXICODONE) 10 MG tablet Take 1 tablet (10 mg) by mouth every 3 hours as needed for breakthrough pain, Disp-12 tablet, R-0, E-Prescribe         CONTINUE these medications which have NOT CHANGED    Details   buprenorphine-naloxone (SUBOXONE) 8-2 MG SUBL sublingual tablet Place 1 tablet under  the tongue 2 times daily , HistoricalNADEAN:       diazepam (VALIUM) 10 MG tablet Take 10 mg by mouth 2 times daily , Historical      DULoxetine (CYMBALTA) 60 MG capsule Take 60 mg by mouth 2 times daily , Historical      estradiol (CLIMARA) 0.0375 MG/24HR weekly patch Place 1 patch onto the skin twice a week , Historical      lisinopril (ZESTRIL) 40 MG tablet Take 40 mg by mouth daily , Historical      potassium chloride ER (KLOR-CON M) 20 MEQ CR tablet Take 20 mEq by mouth daily, Historical      progesterone (PROMETRIUM) 100 MG capsule Take 100 mg by mouth daily , Historical      EPINEPHrine (EPIPEN) 0.3 MG/0.3ML injection Inject 0.3 mg into the muscle every five minutes as needed, in case of bee sting., Historical      nitroglycerin (NITROSTAT) 0.4 MG SL tablet Place under the tongue every 5 minutes as needed for chest pain for up to 3 doses., Historical         STOP taking these medications       pravastatin (PRAVACHOL) 20 MG tablet Comments:   Reason for Stopping:             Allergies   Allergies   Allergen Reactions     Bees Anaphylaxis     Latex      Local reaction .     Data   Most Recent 3 CBC's:  Recent Labs   Lab Test 11/13/20  0549   HGB 11.8      Most Recent 3 BMP's:  Recent Labs   Lab Test 11/12/20  0535 11/10/20   POTASSIUM  --  4.0   CR  --  0.82   * 91     Most Recent 2 LFT's:  Recent Labs   Lab Test 11/10/20   AST 19   ALT 23     Most Recent INR's and Anticoagulation Dosing History:  Anticoagulation Dose History     There is no flowsheet data to display.        Most Recent 3 Troponin's:No lab results found.  Most Recent Cholesterol Panel:No lab results found.  Most Recent 6 Bacteria Isolates From Any Culture (See EPIC Reports for Culture Details):No lab results found.  Most Recent TSH, T4 and A1c Labs:No lab results found.  Results for orders placed or performed during the hospital encounter of 11/11/20   XR Knee Port Right 1/2 Views    Narrative    PROCEDURE: XR KNEE PORT RT 1/2 VW  11/11/2020 11:03 AM    HISTORY: post right total knee    COMPARISONS: 8/10/2020.    TECHNIQUE: 2 views.    FINDINGS: There is a right knee arthroplasty with components in  satisfactory position. Skin staples are seen anteriorly.    No acute bony abnormality is seen.         Impression    IMPRESSION: Right knee arthroplasty.    GAGANDEEP BRUNSON MD

## 2021-11-28 ENCOUNTER — HEALTH MAINTENANCE LETTER (OUTPATIENT)
Age: 61
End: 2021-11-28

## 2022-09-11 ENCOUNTER — HEALTH MAINTENANCE LETTER (OUTPATIENT)
Age: 62
End: 2022-09-11

## 2023-01-22 ENCOUNTER — HEALTH MAINTENANCE LETTER (OUTPATIENT)
Age: 63
End: 2023-01-22

## 2023-12-16 ENCOUNTER — HEALTH MAINTENANCE LETTER (OUTPATIENT)
Age: 63
End: 2023-12-16

## 2024-02-18 ENCOUNTER — HEALTH MAINTENANCE LETTER (OUTPATIENT)
Age: 64
End: 2024-02-18

## (undated) DEVICE — LABEL-STERILE PREPRINTED FOR OR

## (undated) DEVICE — PULSE LAVAGE IRRIGATION SYSTEM

## (undated) DEVICE — ICEMAN W/UNIVERSAL WRAP-ON PAD

## (undated) DEVICE — CAUTERY PAD-POLYHESIVE II ADULT

## (undated) DEVICE — DRAPE-IOBAN 2 35CM X 35CM

## (undated) DEVICE — SYRINGE-30CC LUER LOCK

## (undated) DEVICE — SUTURE-VICRYL 2-0 CP-1 VCP266H

## (undated) DEVICE — SUTURE-VICRYL 1 CTX J371H

## (undated) DEVICE — IRRIGATION-NACL 1000ML

## (undated) DEVICE — GLV-8.5 ORTHO PROTEXIS PI LF/PF

## (undated) DEVICE — BLADE-SAGITTAL 18MM X 90MM X 1.27MM

## (undated) DEVICE — DRSG-AQUACEL AG 3.5" X 14" SURGICAL

## (undated) DEVICE — DRSG-SPONGE STERILE 8 X 4

## (undated) DEVICE — CUFF-DISP STERILE 30IN SINGLE BLADDER

## (undated) DEVICE — IRRIGATION-H2O 1000ML

## (undated) DEVICE — IRRIGATION-H2O 1000ML BAG

## (undated) DEVICE — PACK-KNEE-CUSTOM

## (undated) DEVICE — LIGHT HANDLE COVER

## (undated) DEVICE — Device

## (undated) DEVICE — APPLICATOR-CHLORAPREP 26ML TINTED CHG 2%+ 70% IPA-SURGICAL

## (undated) DEVICE — GLV-9.0 ORTHO PROTEXIS PI LF/PF

## (undated) DEVICE — PACK-BASIN SET-UP

## (undated) DEVICE — BLADE-SAGITTAL DUAL CUT 25MM X 100MM X 1.27MM

## (undated) DEVICE — SCD SLEEVE-KNEE REG.

## (undated) DEVICE — FOOT PADS-TOTAL KNEE POSITIONER

## (undated) RX ORDER — PROPOFOL 10 MG/ML
INJECTION, EMULSION INTRAVENOUS
Status: DISPENSED
Start: 2020-11-11